# Patient Record
Sex: MALE | Race: WHITE | Employment: OTHER | URBAN - METROPOLITAN AREA
[De-identification: names, ages, dates, MRNs, and addresses within clinical notes are randomized per-mention and may not be internally consistent; named-entity substitution may affect disease eponyms.]

---

## 2017-02-22 PROBLEM — R33.9 URINARY RETENTION: Status: ACTIVE | Noted: 2017-02-22

## 2020-06-17 ENCOUNTER — HOSPITAL ENCOUNTER (OUTPATIENT)
Dept: PREADMISSION TESTING | Age: 75
Discharge: HOME | DRG: 660 | End: 2020-06-17
Payer: MEDICARE

## 2020-06-17 PROCEDURE — 87635 SARS-COV-2 COVID-19 AMP PRB: CPT

## 2020-06-18 LAB — SARS-COV-2, COV2NT: NOT DETECTED

## 2020-06-19 ENCOUNTER — HOSPITAL ENCOUNTER (INPATIENT)
Age: 75
LOS: 4 days | Discharge: HOME | DRG: 660 | End: 2020-06-23
Attending: UROLOGY | Admitting: FAMILY MEDICINE
Payer: MEDICARE

## 2020-06-19 PROBLEM — N13.30 BILATERAL HYDRONEPHROSIS: Status: ACTIVE | Noted: 2020-06-19

## 2020-06-19 LAB
ATRIAL RATE: 77 BPM
CALCULATED P AXIS, ECG09: 43 DEGREES
CALCULATED R AXIS, ECG10: -40 DEGREES
CALCULATED T AXIS, ECG11: 78 DEGREES
DIAGNOSIS, 93000: NORMAL
P-R INTERVAL, ECG05: 164 MS
Q-T INTERVAL, ECG07: 412 MS
QRS DURATION, ECG06: 94 MS
QTC CALCULATION (BEZET), ECG08: 466 MS
VENTRICULAR RATE, ECG03: 77 BPM

## 2020-06-19 PROCEDURE — 74011250637 HC RX REV CODE- 250/637: Performed by: FAMILY MEDICINE

## 2020-06-19 PROCEDURE — 93005 ELECTROCARDIOGRAM TRACING: CPT

## 2020-06-19 PROCEDURE — 87086 URINE CULTURE/COLONY COUNT: CPT

## 2020-06-19 PROCEDURE — 65270000029 HC RM PRIVATE

## 2020-06-19 PROCEDURE — 74011250636 HC RX REV CODE- 250/636: Performed by: FAMILY MEDICINE

## 2020-06-19 PROCEDURE — 77030040831 HC BAG URINE DRNG MDII -A

## 2020-06-19 PROCEDURE — 74011000258 HC RX REV CODE- 258: Performed by: FAMILY MEDICINE

## 2020-06-19 RX ORDER — ENOXAPARIN SODIUM 100 MG/ML
40 INJECTION SUBCUTANEOUS EVERY 24 HOURS
Status: DISCONTINUED | OUTPATIENT
Start: 2020-06-19 | End: 2020-06-19

## 2020-06-19 RX ORDER — ATORVASTATIN CALCIUM 40 MG/1
40 TABLET, FILM COATED ORAL DAILY
Status: DISCONTINUED | OUTPATIENT
Start: 2020-06-20 | End: 2020-06-23

## 2020-06-19 RX ORDER — ACETAMINOPHEN 325 MG/1
650 TABLET ORAL
Status: DISCONTINUED | OUTPATIENT
Start: 2020-06-19 | End: 2020-06-23

## 2020-06-19 RX ORDER — PANTOPRAZOLE SODIUM 20 MG/1
20 TABLET, DELAYED RELEASE ORAL
Status: DISCONTINUED | OUTPATIENT
Start: 2020-06-19 | End: 2020-06-20

## 2020-06-19 RX ORDER — METOPROLOL TARTRATE 50 MG/1
50 TABLET ORAL
Status: DISCONTINUED | OUTPATIENT
Start: 2020-06-19 | End: 2020-06-20

## 2020-06-19 RX ORDER — ENOXAPARIN SODIUM 100 MG/ML
40 INJECTION SUBCUTANEOUS EVERY 24 HOURS
Status: DISCONTINUED | OUTPATIENT
Start: 2020-06-19 | End: 2020-06-20

## 2020-06-19 RX ORDER — AMLODIPINE BESYLATE 2.5 MG/1
2.5 TABLET ORAL DAILY
Status: DISCONTINUED | OUTPATIENT
Start: 2020-06-20 | End: 2020-06-20

## 2020-06-19 RX ORDER — HYDRALAZINE HYDROCHLORIDE 20 MG/ML
10 INJECTION INTRAMUSCULAR; INTRAVENOUS
Status: DISCONTINUED | OUTPATIENT
Start: 2020-06-19 | End: 2020-06-23

## 2020-06-19 RX ORDER — LOSARTAN POTASSIUM 50 MG/1
50 TABLET ORAL 2 TIMES DAILY
Status: DISCONTINUED | OUTPATIENT
Start: 2020-06-19 | End: 2020-06-23

## 2020-06-19 RX ADMIN — PANTOPRAZOLE SODIUM 20 MG: 20 TABLET, DELAYED RELEASE ORAL at 17:36

## 2020-06-19 RX ADMIN — LOSARTAN POTASSIUM 50 MG: 50 TABLET ORAL at 17:36

## 2020-06-19 RX ADMIN — CEFTAZIDIME 1 G: 1 INJECTION, POWDER, FOR SOLUTION INTRAMUSCULAR; INTRAVENOUS at 17:38

## 2020-06-19 RX ADMIN — ENOXAPARIN SODIUM 40 MG: 40 INJECTION SUBCUTANEOUS at 17:36

## 2020-06-19 NOTE — H&P
History & Physical      Patient: Santiago Hill MRN: [id number]  CSN: [id number]    YOB: 1945  Age: 66 y.o. Sex: male      DOA: 6/19/2020    Chief Complaint: No chief complaint on file. HPI:     Santiago Hill is a 66 y.o. male with PMHx of bilateral hydroureteronephrosis, urinary retention, urinary stricture, prostate CA, colon CA s/p resection and pelvic radiation, CAD, MI, HTN, HLD, gastric ulcer and chronic constipation who is a direct admit to the hospital for bilateral hydroureternephrosis of unclear etiology who is scheduled for cystoscopy, urethral dilation, possible TURBT and bilateral diagnostic ureteroscopy on Monday 6/22/20. Pt is seen in room today, comfortable in bed, c/o foul-smelling urine lately, no other complaints. He has a suprapubic catheter that he's had for 6-8 years and was placed after prostate CA and colon CA. Reports he was started on a PO abx for UTI but the culture results came back showing he needed IV abx. Urine Cx from 6/10/20 show Pseudomonas and Stenotrophomonas both sensitive to ceftazidime. Mr. Luigi Valdez does mention he had an episode of dark stool this morning and has recent history of gastric ulcer. He has no other complaints and is looking forward to hopefully going home Monday after his procedure.      Past Medical History:   Diagnosis Date    CAD (coronary artery disease)     Colon cancer ([facility] [address] 1978    Alyse    Elevated cholesterol     Elevated PSA 3/21/13    Dr. Lillian Sevilla Erectile dysfunction     Exertional dyspnea     Groin pain, chronic, right     Hypertension     Incontinence     Infection of prosthesis ([address]     Kidney stone     MI (myocardial infarction) ([address] 2012    Nonspecific abnormal electrocardiogram (ECG) (EKG)     Prostate cancer (HCC)     cT1c aidee 4 + 5 adenocarcinoma of the prostate s/p cryoablation 9/9/13    Stress incontinence     Traumatic membranous urethral stricture     Ulcer 2010    Alyse Guernsey    Unspecified constipation     Urethral stricture     Urinary incontinence     Urinary retention     Urinary retention 2/22/2017    Urinary tract infection with hematuria        Past Surgical History:   Procedure Laterality Date    HX COLECTOMY  1978    HX CORONARY ARTERY BYPASS GRAFT  2010    CABG. .. triple    in [facility]    HX CRYOABLATION OF THE PROSTATE  9/9/13    HX HERNIA REPAIR      right   [facility]  [id number]    cystoscopy, laser cystolitholapaxy    HX UROLOGICAL  6/2015    pump in bladder    HX UROLOGICAL  6/17/16    Explant urinary sphincter    HX UROLOGICAL  12/28/2016    reddy awad, Cystourethroscopy, Urethral dilation, Suprapubic catheter placement       Family History   Problem Relation Age of Onset    Asthma Sister     Cancer Other         uncle, prostate       Social History     Socioeconomic History    Marital status:      Spouse name: Not on file    Number of children: Not on file    Years of education: Not on file    Highest education level: Not on file   Occupational History    Occupation: retired   Tobacco Use    Smoking status: Never Smoker    Smokeless tobacco: Never Used   Substance and Sexual Activity    Alcohol use: Yes     Alcohol/week: 0.0 - 1.0 standard drinks     Comment: seldom    Drug use: No    Sexual activity: Not Currently     Partners: Female       Prior to Admission medications    Medication Sig Start Date End Date Taking? Authorizing Provider   amLODIPine (NORVASC) 2.5 mg tablet take 1 tablet by mouth once daily 6/3/20  Yes Provider, Historical   losartan (COZAAR) 50 mg tablet take 1 tablet by mouth twice a day 5/13/20  Yes Provider, Historical   omeprazole (PRILOSEC) 20 mg capsule take 1 capsule by mouth twice a day for 30 DAYS THEN TAKE 1 once daily THEREAFTER 5/13/20  Yes Provider, Historical   sodium chloride irrigation (Sterile Saline) 0.9 % irrigation Use PRN for irrigation flushes.  5/6/20  Yes Gerry Sousa MD   Urinary Bag (BARD DISPOZ-A-BAG-FLIP-ARGENIS VLV) misc 10 Bags by Does Not Apply route as needed for Other. 20  Yes Leonela Zamora MD   metoprolol (LOPRESSOR) 100 mg tablet 50 mg at bedtime as directed for dose pack. 13  Yes Provider, Historical   cyanocobalamin (VITAMIN B-12) 500 mcg tablet Take 500 mcg by mouth daily. Yes Provider, Historical   atorvastatin (LIPITOR) 40 mg tablet Take 40 mg by mouth daily. Yes Provider, Historical   ergocalciferol (VITAMIN D2) 50,000 unit capsule Take 50,000 Units by mouth every seven (7) days. Yes Provider, Historical   aspirin 81 mg tablet Take 81 mg by mouth at bedtime as directed for dose pack. Yes Provider, Historical       Allergies   Allergen Reactions    Penicillins Hives     Has tolerated cefepime.  Bactrim [Sulfamethoxazole-Trimethoprim] Hives         Review of Systems  GENERAL: Patient alert, awake and oriented times 3, able to communicate full sentences and not in distress. HEENT: No change in vision, sore throat or sinus congestion. NECK: No pain or stiffness. PULMONARY: No shortness of breath, cough or wheeze. Cardiovascular: no pnd or orthopnea, no CP  GASTROINTESTINAL: No abdominal pain, nausea, vomiting or diarrhea, or blood per rectum. GENITOURINARY: + foul-smelling urine; No urinary frequency, urgency, hesitancy or dysuria. MUSCULOSKELETAL: No joint or muscle pain, no back pain  DERMATOLOGIC: No rash, no itching, no lesions. ENDOCRINE: No polyuria, polydipsia, no heat or cold intolerance. No recent change in weight. HEMATOLOGICAL: No anemia or easy bruising or bleeding. NEUROLOGIC: No headache, seizures, numbness, tingling or weakness.        Physical Exam:     Physical Exam:  Visit Vitals  /74 (BP 1 Location: Right arm, BP Patient Position: At rest)   Pulse 64   Temp 98.1 °F (36.7 °C)   Resp 18   SpO2 97%           Temp (24hrs), Av.1 °F (36.7 °C), Min:98.1 °F (36.7 °C), Max:98.1 °F (36.7 °C)    No intake/output data recorded. No intake/output data recorded. General:  Alert, cooperative, no distress, appears stated age. Head: Normocephalic, without obvious abnormality, atraumatic. Eyes:  Conjunctivae/corneas clear. PERRL, EOMs intact. Nose: Nares normal. No drainage or sinus tenderness. Neck: Supple, symmetrical, trachea midline, no adenopathy, thyroid: no enlargement, no carotid bruit and no JVD. Lungs:   Clear to auscultation bilaterally. Heart:   Irregularly irregular      Abdomen: Soft, non-tender. Bowel sounds normal. Suprapubic cath in place draining clear yellow urine; entry site is clean and dry     Extremities: Extremities normal, atraumatic, no cyanosis or edema. Pulses: 2+ and symmetric all extremities. Skin:  No rashes or lesions   Neurologic: AAOx3, No focal motor or sensory deficit. Labs Reviewed:    CMP: No results found for: NA, K, CL, CO2, AGAP, GLU, BUN, CREA, GFRAA, GFRNA, CA, MG, PHOS, ALB, TBIL, TP, ALB, GLOB, AGRAT, ALT  CBC: No results found for: WBC, HGB, HGBEXT, HCT, HCTEXT, PLT, PLTEXT, HGBEXT, HCTEXT, PLTEXT  Recent Glucose Results: No results found for: GLU  ABG: No results found for: PH, PHI, PCO2, PCO2I, PO2, PO2I, HCO3, HCO3I, FIO2, FIO2I        Assessment & Plan     Warrick Search is a 66 y.o. male with PMHx of bilateral hydroureteronephrosis, urinary retention, urinary stricture, prostate CA, colon CA s/p resection and pelvic radiation, CAD, MI, HTN, HLD, gastric ulcer and chronic constipation who is a direct admit to the hospital for bilateral hydroureternephrosis of unclear etiology who is scheduled for cystoscopy, urethral dilation, possible TURBT and bilateral diagnostic ureteroscopy on Monday 6/22/20. 1. Bilateral hydroureteronephrosis   2. Urinary retention   3. Urinary stricture    4. Hx prostate CA   5. Hx colon CA s/p resection and pelvic radiation   6. Gastric ulcer w/recent GI bleed   7. Report of dark stool   8. CAD   9. HTN   10.  HLD 11. Chronic constipation   12. Irregular HR     Urology consulted   Surgery planned for Monday   COVID-19 negative on 6/17/20   Start IV abx- ceftazidime 1 g q8hrs   Check CBC, BMP and urine cx in am  Check hemoccult    Will check EKG   Cont home meds: amlodipine, statin, losartan, metoprolol, PPI   Lovenox to be held on Sunday for surgery on Monday       DVT prophylaxis: Lovenox   Diet: Cardiac   Contact: Sarah Fragoso (wife)  [phone]  Code Status: FULL   Disposition: Admit to medical bed; >2 nights       Discussed with patient at bedside about hospital admission and my plan care, who understood and agreed with my plan care.       Nino Poole,    6/19/2020

## 2020-06-19 NOTE — CONSULTS
[address], [address]                                                      Phone: [phone]  Urology Consult Note             No diagnosis found. Assessment & Plan     ASSESSMENT:  Bilateral hydroureteronephrosis with no clear etiology s/p first right stent, then left in setting of sterile hydro initially, second event with sepsis  Dense BNC, inaccessible by dilation, with long term SP tube   T1c Aidee 4+5 CaP s/p cryoablation 4/2013, PSA 2/2020 <0.03   Colon cancer s/p Resection and pelvic radiation   Chronic Constipation   Recent Admit for GI Bleed       PLAN:  Pre-admit to medicine for IV abx for upcoming surgery on Monday- greatly appreciate assistance  Maintain SPT  Ucx ordered. Start Minoo Husbands today  BMP and CBC in am ordered  Patient c/o dark tarry stool today- defer to medicine- Dr. Marie Santizo aware  plan for cystoscopy with urethral dilation, possible TURBT/ random bladder biopsies, bilateral diagnostic ureteroscopy, bilateral JJ stent exchange and SP tube exchange 6/22/2020  Follow Up arranged: NO  Will see again on Sunday for Pre-op. Please contact sooner with any questions or concerns      Saundra Pastor  [facility]  Pager # [phone]    Available M-F 7:30- 5pm .  After 5pm and weekends please call answering service at [phone]         No chief complaint on file. HISTORY OF PRESENT ILLNESS:    Dinorah Bacon is a 66 y.o. male who is seen in consultation as referred by Antwan Suero  for Pre-Admit for urological procedure on Monday. Patient has  been seen by a urologist and is  currently receiving urological care with Dr. Vitaly Katz. He was previously followed by Dr. Michael Nunez for urinary retention managed with an SP tube. Pt with a hx of aidee 4+5 CaP s/p cryoablation in 2013.  He had a  sphincter placed in the past for incontinence but it was removed due to erosion and pain 6/2016. Pt was then noted to have a urethral stricture after the sphincter was removed and an SP tube was placed. Due to prior radiation from colon cancer it was thought that repair of the stricture would not be successful. S/p SP tube placement 12/2016. Has been having monthly SP tube exchanges. Pt recently had two admissions for hydronephrosis. S/p left stent placement 4/27/2020 and right stent placement 5/6/2020. He also had a catheter placed during his last admission in preparation for a cystoscopy with bladder biopsy, bilateral URS scheduled 6/22/2020. HPI: Patient is Pre-admitted for IV abx 2/2 MDRO. Denies any issues with SPT. No FCNV No abdominal pain, no flank pain. Does state some dark tarry stool today- worried due to recent admit for upper GI bleed. No longer on ASA. No other complaints    Location  tract  Duration Months   Associated signs/symptoms as above  Modifying factors SPT  Severity unknown        No flowsheet data found.       Past Medical History:   Diagnosis Date    CAD (coronary artery disease)     Colon cancer ([facility] [address] 1978    Alyse    Elevated cholesterol     Elevated PSA 3/21/13    Dr. Donnie Desai Erectile dysfunction     Exertional dyspnea     Groin pain, chronic, right     Hypertension     Incontinence     Infection of prosthesis ([address]     Kidney stone     MI (myocardial infarction) ([address] 2012    Nonspecific abnormal electrocardiogram (ECG) (EKG)     Prostate cancer (HCC)     cT1c aidee 4 + 5 adenocarcinoma of the prostate s/p cryoablation 9/9/13    Stress incontinence     Traumatic membranous urethral stricture     Ulcer 2010    Alyse Alcaraz    Unspecified constipation     Urethral stricture     Urinary incontinence     Urinary retention     Urinary retention 2/22/2017    Urinary tract infection with hematuria        Past Surgical History:   Procedure Laterality Date    HX COLECTOMY  1978    HX CORONARY ARTERY BYPASS GRAFT  2010    [facility]G. .. triple    in [facility]    HX CRYOABLATION OF THE PROSTATE  9/9/13    HX HERNIA REPAIR      right   [facility]  [id number]    cystoscopy, laser cystolitholapaxy    HX UROLOGICAL  6/2015    pump in bladder    HX UROLOGICAL  6/17/16    Explant urinary sphincter    HX UROLOGICAL  12/28/2016    reddy awad, Cystourethroscopy, Urethral dilation, Suprapubic catheter placement       Social History     Tobacco Use    Smoking status: Never Smoker    Smokeless tobacco: Never Used   Substance Use Topics    Alcohol use: Yes     Alcohol/week: 0.0 - 1.0 standard drinks     Comment: seldom    Drug use: No       Allergies   Allergen Reactions    Penicillins Hives     Has tolerated cefepime.  Bactrim [Sulfamethoxazole-Trimethoprim] Hives       Family History   Problem Relation Age of Onset    Asthma Sister     Cancer Other         uncle, prostate           Review of Systems    Pertinent items are noted in the History of Present Illness. PHYSICAL EXAMINATION:   There were no vitals taken for this visit. Constitutional: Well developed, well nourished male. No acute distress. HEENT: Normocephalic, Atraumatic  CV:  RRR   Pulm: No respiratory distress or difficulties breathing   GI:  No abdominal masses or tenderness. :  No CVA tenderness. SPT draining cloudy yellow urine  SCROTUM:  No scrotal rash or lesions noticed. Normal bilateral testes and epididymis. PENIS: Urethral meatus normal in location and size. No urethral discharge. Circumcised   Skin: No evidence of jaundice. Normal color  Neuro/Psych:  Alert and oriented. Affect appropriate. Lymphatic:   No inguinal lymphadenopathy  MSK: FROM         REVIEW OF LABS AND IMAGING:        Labs: Results:   Chemistry  No results for input(s): GLU, NA, K, CL, CO2, BUN, CREA, CA, AGAP, BUCR, TBIL, AP, TP, ALB, GLOB, AGRAT in the last 72 hours.     No lab exists for component: GPT   CBC w/Diff No results for input(s): WBC, RBC, HGB, HCT, PLT, GRANS, LYMPH, EOS, HGBEXT, HCTEXT, PLTEXT in the last 72 hours. Cultures No results for input(s): CULT in the last 72 hours. All Micro Results     None            Urinalysis Potassium   Date Value Ref Range Status   06/10/2020 4.4 3.5 - 5.2 mmol/L Final     Creatinine   Date Value Ref Range Status   06/10/2020 0.78 0.76 - 1.27 mg/dL Final     BUN   Date Value Ref Range Status   06/10/2020 11 8 - 27 mg/dL Final     Prostate Specific Ag   Date Value Ref Range Status   11/05/2014 <0.03 0.00 - 4.00 ng/mL Final      PSA No results for input(s): PSA in the last 72 hours.    Coagulation Lab Results   Component Value Date/Time    Prothrombin time 10.8 01/16/2015 11:18 AM    INR 1.09 01/16/2015 11:18 AM

## 2020-06-20 LAB
ANION GAP SERPL CALC-SCNC: 5 MMOL/L (ref 3–18)
BACTERIA SPEC CULT: ABNORMAL
BUN SERPL-MCNC: 12 MG/DL (ref 7–18)
BUN/CREAT SERPL: 19 (ref 12–20)
CALCIUM SERPL-MCNC: 9.2 MG/DL (ref 8.5–10.1)
CC UR VC: ABNORMAL
CHLORIDE SERPL-SCNC: 107 MMOL/L (ref 100–111)
CO2 SERPL-SCNC: 27 MMOL/L (ref 21–32)
CREAT SERPL-MCNC: 0.62 MG/DL (ref 0.6–1.3)
ERYTHROCYTE [DISTWIDTH] IN BLOOD BY AUTOMATED COUNT: 13.7 % (ref 11.6–14.5)
GLUCOSE SERPL-MCNC: 97 MG/DL (ref 74–99)
HCT VFR BLD AUTO: 32.1 % (ref 36–48)
HCT VFR BLD AUTO: 33.5 % (ref 36–48)
HGB BLD-MCNC: 10.4 G/DL (ref 13–16)
HGB BLD-MCNC: 11.2 G/DL (ref 13–16)
MCH RBC QN AUTO: 28.1 PG (ref 24–34)
MCHC RBC AUTO-ENTMCNC: 32.4 G/DL (ref 31–37)
MCV RBC AUTO: 86.8 FL (ref 74–97)
PLATELET # BLD AUTO: 370 K/UL (ref 135–420)
PMV BLD AUTO: 9.7 FL (ref 9.2–11.8)
POTASSIUM SERPL-SCNC: 3.8 MMOL/L (ref 3.5–5.5)
RBC # BLD AUTO: 3.7 M/UL (ref 4.7–5.5)
SERVICE CMNT-IMP: ABNORMAL
SODIUM SERPL-SCNC: 139 MMOL/L (ref 136–145)
WBC # BLD AUTO: 6.1 K/UL (ref 4.6–13.2)

## 2020-06-20 PROCEDURE — 74011250637 HC RX REV CODE- 250/637: Performed by: FAMILY MEDICINE

## 2020-06-20 PROCEDURE — 85018 HEMOGLOBIN: CPT

## 2020-06-20 PROCEDURE — 80048 BASIC METABOLIC PNL TOTAL CA: CPT

## 2020-06-20 PROCEDURE — 36415 COLL VENOUS BLD VENIPUNCTURE: CPT

## 2020-06-20 PROCEDURE — 74011000258 HC RX REV CODE- 258

## 2020-06-20 PROCEDURE — 74011250636 HC RX REV CODE- 250/636

## 2020-06-20 PROCEDURE — 85027 COMPLETE CBC AUTOMATED: CPT

## 2020-06-20 PROCEDURE — 65270000029 HC RM PRIVATE

## 2020-06-20 PROCEDURE — 74011250636 HC RX REV CODE- 250/636: Performed by: FAMILY MEDICINE

## 2020-06-20 PROCEDURE — 74011250637 HC RX REV CODE- 250/637: Performed by: INTERNAL MEDICINE

## 2020-06-20 PROCEDURE — 74011000258 HC RX REV CODE- 258: Performed by: FAMILY MEDICINE

## 2020-06-20 RX ORDER — ERGOCALCIFEROL 1.25 MG/1
50000 CAPSULE ORAL
Status: DISCONTINUED | OUTPATIENT
Start: 2020-06-20 | End: 2020-06-23

## 2020-06-20 RX ORDER — AMLODIPINE BESYLATE 5 MG/1
5 TABLET ORAL 2 TIMES DAILY
Status: DISCONTINUED | OUTPATIENT
Start: 2020-06-20 | End: 2020-06-21

## 2020-06-20 RX ORDER — METOPROLOL TARTRATE 50 MG/1
50 TABLET ORAL
Status: DISCONTINUED | OUTPATIENT
Start: 2020-06-20 | End: 2020-06-23

## 2020-06-20 RX ORDER — LANOLIN ALCOHOL/MO/W.PET/CERES
500 CREAM (GRAM) TOPICAL DAILY
Status: DISCONTINUED | OUTPATIENT
Start: 2020-06-21 | End: 2020-06-23

## 2020-06-20 RX ORDER — ONDANSETRON 2 MG/ML
4 INJECTION INTRAMUSCULAR; INTRAVENOUS
Status: DISCONTINUED | OUTPATIENT
Start: 2020-06-20 | End: 2020-06-23

## 2020-06-20 RX ORDER — AMOXICILLIN 250 MG
1 CAPSULE ORAL DAILY
Status: DISCONTINUED | OUTPATIENT
Start: 2020-06-20 | End: 2020-06-23

## 2020-06-20 RX ORDER — POLYETHYLENE GLYCOL 3350 17 G/17G
17 POWDER, FOR SOLUTION ORAL
Status: DISCONTINUED | OUTPATIENT
Start: 2020-06-20 | End: 2020-06-23

## 2020-06-20 RX ORDER — PANTOPRAZOLE SODIUM 40 MG/1
40 TABLET, DELAYED RELEASE ORAL
Status: DISCONTINUED | OUTPATIENT
Start: 2020-06-20 | End: 2020-06-23

## 2020-06-20 RX ADMIN — AMLODIPINE BESYLATE 5 MG: 5 TABLET ORAL at 17:05

## 2020-06-20 RX ADMIN — PANTOPRAZOLE SODIUM 40 MG: 20 TABLET, DELAYED RELEASE ORAL at 15:37

## 2020-06-20 RX ADMIN — METOPROLOL TARTRATE 50 MG: 50 TABLET, FILM COATED ORAL at 22:07

## 2020-06-20 RX ADMIN — CEFTAZIDIME 1 G: 1 INJECTION, POWDER, FOR SOLUTION INTRAMUSCULAR; INTRAVENOUS at 22:08

## 2020-06-20 RX ADMIN — ACETAMINOPHEN 650 MG: 325 TABLET ORAL at 22:09

## 2020-06-20 RX ADMIN — CEFTAZIDIME 1 G: 1 INJECTION, POWDER, FOR SOLUTION INTRAMUSCULAR; INTRAVENOUS at 05:56

## 2020-06-20 RX ADMIN — CEFTAZIDIME 1 G: 1 INJECTION, POWDER, FOR SOLUTION INTRAMUSCULAR; INTRAVENOUS at 02:28

## 2020-06-20 RX ADMIN — ATORVASTATIN CALCIUM 40 MG: 40 TABLET, FILM COATED ORAL at 08:05

## 2020-06-20 RX ADMIN — CEFTAZIDIME 1 G: 1 INJECTION, POWDER, FOR SOLUTION INTRAMUSCULAR; INTRAVENOUS at 14:27

## 2020-06-20 RX ADMIN — LOSARTAN POTASSIUM 50 MG: 50 TABLET ORAL at 17:06

## 2020-06-20 RX ADMIN — PANTOPRAZOLE SODIUM 20 MG: 20 TABLET, DELAYED RELEASE ORAL at 05:56

## 2020-06-20 RX ADMIN — ERGOCALCIFEROL 50000 UNITS: 1.25 CAPSULE ORAL at 17:05

## 2020-06-20 RX ADMIN — AMLODIPINE BESYLATE 2.5 MG: 2.5 TABLET ORAL at 08:05

## 2020-06-20 RX ADMIN — ENOXAPARIN SODIUM 40 MG: 40 INJECTION SUBCUTANEOUS at 15:21

## 2020-06-20 RX ADMIN — LOSARTAN POTASSIUM 50 MG: 50 TABLET ORAL at 08:06

## 2020-06-20 RX ADMIN — DOCUSATE SODIUM 50 MG AND SENNOSIDES 8.6 MG 1 TABLET: 8.6; 5 TABLET, FILM COATED ORAL at 17:05

## 2020-06-20 NOTE — PROGRESS NOTES
Patient a direct admit to hospital from Dr. Hood Napier office. Francois Chaves NP paged that patient had arrived. Per NP patient will be seen by Hospitalist and orders will be written then. Patient oriented to room, bed, call bell and TV. White board updated. Patient given a gown and socks to change into. No distress noted. Patient alert and oriented x 4. Respirations even and unlabored. Patient has supra pubic catheter with leg bag draining clear yellow urine. Skin dry and warm and intact.

## 2020-06-20 NOTE — PROGRESS NOTES
IV started for antibiotics. Leg bag on morgan changed to large catheter bag. UA sent to lab. EKG was completed earlier per orders. No complaints of pain. VSS.

## 2020-06-20 NOTE — PROGRESS NOTES
SUBJECTIVE:    Patient is sitting up in chair without any issues. Denies any headaches or dizziness. No nausea vomiting. No flank pain or abdominal pain. No bowel or bladder disturbances. He states he has been patiently waiting to have surgery done on Monday so he can go home. OBJECTIVE:    /73 (BP 1 Location: Right arm, BP Patient Position: At rest)   Pulse 75   Temp 97.2 °F (36.2 °C)   Resp 16   SpO2 99%     General appearance - alert, well appearing, and in no distress  HEENT -atraumatic, normocephalic, no pallor, oral mucosa is moist  Neck -no JVD and trachea is midline  Chest -clear air entry noted in bases, no wheezes  Heart - S1 and S2 normal  Abdomen - soft, nontender, nondistended, Bowel sounds present, suprapubic catheter in situ  Neurological - alert, oriented, normal speech, no focal findings noted  Musculoskeletal - no joint tenderness or swelling of knees bilaterally  Extremities - no pedal edema noted    ASSESSMENT:    1.  Bilateral hydroureteronephrosis  2. Urinary retention due to urinary stricture status post suprapubic catheter  3. History of prostate cancer  4. History of colon cancer status post resections and pelvic radiation  5. Gastric ulcer with recent GI bleed  6. Dark stool, improving. Stable H&H currently. 7.  Coronary artery disease  8. Hypertension  9. Dyslipidemia  10. Constipation    PLAN:    Continue current management  Urology input noted about urological procedure on Monday  Increase home dose of Norvasc and monitor blood pressure  Add Lilian-Colace for #10  Restart vitamin B12 and vitamin D  Continue PPI and monitor H&H    Disclaimer: Sections of this note are dictated using utilizing voice recognition software, which may have resulted in some phonetic based errors in grammar and contents. Even though attempts were made to correct all the mistakes, some may have been missed, and remained in the body of the document.  If questions arise, please contact our department.     Recent Results (from the past 24 hour(s))   EKG, 12 LEAD, INITIAL    Collection Time: 06/19/20  3:31 PM   Result Value Ref Range    Ventricular Rate 77 BPM    Atrial Rate 77 BPM    P-R Interval 164 ms    QRS Duration 94 ms    Q-T Interval 412 ms    QTC Calculation (Bezet) 466 ms    Calculated P Axis 43 degrees    Calculated R Axis -40 degrees    Calculated T Axis 78 degrees    Diagnosis       Sinus rhythm with sinus arrhythmia with frequent premature ventricular   complexes  Left axis deviation  Minimal voltage criteria for LVH, may be normal variant  Abnormal ECG  When compared with ECG of 14-JUN-2016 11:39,  premature ventricular complexes are now present  Nonspecific T wave abnormality no longer evident in Inferior leads  T wave inversion no longer evident in Anterolateral leads  Confirmed by Kate Marquez ([id number]) on 6/19/2020 4:04:09 PM     CBC W/O DIFF    Collection Time: 06/20/20  3:48 AM   Result Value Ref Range    WBC 6.1 4.6 - 13.2 K/uL    RBC 3.70 (L) 4.70 - 5.50 M/uL    HGB 10.4 (L) 13.0 - 16.0 g/dL    HCT 32.1 (L) 36.0 - 48.0 %    MCV 86.8 74.0 - 97.0 FL    MCH 28.1 24.0 - 34.0 PG    MCHC 32.4 31.0 - 37.0 g/dL    RDW 13.7 11.6 - 14.5 %    PLATELET 784 848 - 083 K/uL    MPV 9.7 9.2 - 92.5 FL   METABOLIC PANEL, BASIC    Collection Time: 06/20/20  3:48 AM   Result Value Ref Range    Sodium 139 136 - 145 mmol/L    Potassium 3.8 3.5 - 5.5 mmol/L    Chloride 107 100 - 111 mmol/L    CO2 27 21 - 32 mmol/L    Anion gap 5 3.0 - 18 mmol/L    Glucose 97 74 - 99 mg/dL    BUN 12 7.0 - 18 MG/DL    Creatinine 0.62 0.6 - 1.3 MG/DL    BUN/Creatinine ratio 19 12 - 20      GFR est AA >60 >60 ml/min/1.73m2    GFR est non-AA >60 >60 ml/min/1.73m2    Calcium 9.2 8.5 - 10.1 MG/DL 170.18

## 2020-06-20 NOTE — ROUTINE PROCESS
Bedside shift change report given to Deyanira Eason RN (oncoming nurse) by Rissa Marie RN (offgoing nurse). Report included the following information SBAR, Kardex, Intake/Output, MAR, Recent Results and Med Rec Status.

## 2020-06-20 NOTE — PROGRESS NOTES
Problem: Falls - Risk of  Goal: *Absence of Falls  Description: Document Kira Rushing Fall Risk and appropriate interventions in the flowsheet.   Outcome: Progressing Towards Goal  Note: Fall Risk Interventions:            Medication Interventions: Assess postural VS orthostatic hypotension, Evaluate medications/consider consulting pharmacy, Patient to call before getting OOB, Teach patient to arise slowly                   Problem: Patient Education: Go to Patient Education Activity  Goal: Patient/Family Education  Outcome: Progressing Towards Goal

## 2020-06-20 NOTE — PROGRESS NOTES
End of Shift Note     Bedside and verbal shift change report given to  Ashlyn Ambrose (On coming nurse) by  Diego Mejia (Off going nurse).   Report included the following information:      --Procedure Summary     --MAR,     --Recent Results     --Med Rec Status    SBAR Recommendations:    Issues for Provider to address           Activity This Shift     [] Bed Rest Order   [] Refused   [] Dangled    [] TDWB         Ambulating:     [] Bathroom     [] BSC     [x] Room/Hallway      Up in Chair for meals    [x]Yes [] No   Voiding       [] Yes  [] No  Alcocer          [x] Yes  [] No  Incontinent [] Yes  [] No    DUE TO VOID POUR        [] Yes [] No  Purewick    [] Yes [] No  New Onset [] Yes [] No Straight Cath   []Yes  [] No  Condom Cath  [] Yes [] No  MD Called      [] Yes  [] No   Blood Sugars Managed []Yes [x] No    Bowels Moved [] Yes [x] No    Incontinent     [] Yes [] No Passed Gas []Yes [] No    New Onset  []Yes [] No        MD Called []Yes  [] No     CHG Bath Done     Before Surgery     After Surgery      [] Yes  [] No  [] Yes  [] No       Drain Removed [] Yes  [] No [x] N/A    Dressing Changed [] Yes   [] No [x] N/A      Nausea/Vomiting [] Yes   [x] No     Ice Packs Changed [] Yes   [] No  [x] N/A    Incentive Spirometer  [] Yes  [x] No      SCD Pumps On     Ankle Pumping  [] Yes   [x] No      [] Yes   [x] No        Telemetry Monitoring [] Yes   [x] No   Rhythm

## 2020-06-20 NOTE — PROGRESS NOTES
Problem: Falls - Risk of  Goal: *Absence of Falls  Description: Document Marilu Carrel Fall Risk and appropriate interventions in the flowsheet.   Outcome: Progressing Towards Goal  Note: Fall Risk Interventions:            Medication Interventions: Assess postural VS orthostatic hypotension, Patient to call before getting OOB, Teach patient to arise slowly                   Problem: Patient Education: Go to Patient Education Activity  Goal: Patient/Family Education  Outcome: Progressing Towards Goal

## 2020-06-20 NOTE — ROUTINE PROCESS
Bedside shift change report given to [facility], RN (oncoming nurse) by Que Garcia RN (offgoing nurse). Report included the following information SBAR, Kardex, Intake/Output, MAR, Recent Results and Med Rec Status.

## 2020-06-20 NOTE — PROGRESS NOTES
End of Shift Note     Bedside and verbal shift change report given to  mina esquivel (On coming nurse) by  Dexter Garcia (Off going nurse).   Report included the following information:      --Procedure Summary     --MAR,     --Recent Results     --Med Rec Status    SBAR Recommendations:    Issues for Provider to address           Activity This Shift     [] Bed Rest Order   [] Refused   [] Dangled    [] TDWB         Ambulating:     [] Bathroom     [] BSC     [x] Room/Hallway      Up in Chair for meals    [x]Yes [] No   Voiding       [] Yes  [] No  Alcocer          [x] Yes  [] No  Incontinent [] Yes  [] No    DUE TO VOID POUR        [] Yes [] No  Purewick    [] Yes [] No  New Onset [] Yes [] No Straight Cath   []Yes  [] No  Condom Cath  [] Yes [] No  MD Called      [] Yes  [] No   Blood Sugars Managed []Yes [x] No    Bowels Moved [] Yes [x] No    Incontinent     [] Yes [] No Passed Gas []Yes [] No    New Onset  []Yes [] No        MD Called []Yes  [] No     CHG Bath Done     Before Surgery     After Surgery      [] Yes  [] No  [] Yes  [] No       Drain Removed [] Yes  [] No [x] N/A    Dressing Changed [] Yes   [] No [x] N/A      Nausea/Vomiting [] Yes   [x] No     Ice Packs Changed [] Yes   [] No  [x] N/A    Incentive Spirometer  [] Yes  [x] No      SCD Pumps On     Ankle Pumping  [] Yes   [x] No      [] Yes   [x] No        Telemetry Monitoring [] Yes   [x] No   Rhythm

## 2020-06-21 ENCOUNTER — ANESTHESIA EVENT (OUTPATIENT)
Dept: SURGERY | Age: 75
DRG: 660 | End: 2020-06-21
Payer: MEDICARE

## 2020-06-21 LAB
ANION GAP SERPL CALC-SCNC: 5 MMOL/L (ref 3–18)
BASOPHILS # BLD: 0.1 K/UL (ref 0–0.1)
BASOPHILS NFR BLD: 1 % (ref 0–2)
BUN SERPL-MCNC: 11 MG/DL (ref 7–18)
BUN/CREAT SERPL: 20 (ref 12–20)
CALCIUM SERPL-MCNC: 9.2 MG/DL (ref 8.5–10.1)
CHLORIDE SERPL-SCNC: 109 MMOL/L (ref 100–111)
CO2 SERPL-SCNC: 23 MMOL/L (ref 21–32)
CREAT SERPL-MCNC: 0.56 MG/DL (ref 0.6–1.3)
DIFFERENTIAL METHOD BLD: ABNORMAL
EOSINOPHIL # BLD: 0.2 K/UL (ref 0–0.4)
EOSINOPHIL NFR BLD: 3 % (ref 0–5)
ERYTHROCYTE [DISTWIDTH] IN BLOOD BY AUTOMATED COUNT: 13.6 % (ref 11.6–14.5)
GLUCOSE SERPL-MCNC: 90 MG/DL (ref 74–99)
HCT VFR BLD AUTO: 33.2 % (ref 36–48)
HGB BLD-MCNC: 10.7 G/DL (ref 13–16)
LYMPHOCYTES # BLD: 1.8 K/UL (ref 0.9–3.6)
LYMPHOCYTES NFR BLD: 28 % (ref 21–52)
MCH RBC QN AUTO: 27.9 PG (ref 24–34)
MCHC RBC AUTO-ENTMCNC: 32.2 G/DL (ref 31–37)
MCV RBC AUTO: 86.5 FL (ref 74–97)
MONOCYTES # BLD: 0.7 K/UL (ref 0.05–1.2)
MONOCYTES NFR BLD: 10 % (ref 3–10)
NEUTS SEG # BLD: 3.7 K/UL (ref 1.8–8)
NEUTS SEG NFR BLD: 58 % (ref 40–73)
PLATELET # BLD AUTO: 385 K/UL (ref 135–420)
PMV BLD AUTO: 9.7 FL (ref 9.2–11.8)
POTASSIUM SERPL-SCNC: 4.5 MMOL/L (ref 3.5–5.5)
RBC # BLD AUTO: 3.84 M/UL (ref 4.7–5.5)
SODIUM SERPL-SCNC: 137 MMOL/L (ref 136–145)
WBC # BLD AUTO: 6.4 K/UL (ref 4.6–13.2)

## 2020-06-21 PROCEDURE — 74011250636 HC RX REV CODE- 250/636: Performed by: INTERNAL MEDICINE

## 2020-06-21 PROCEDURE — 36415 COLL VENOUS BLD VENIPUNCTURE: CPT

## 2020-06-21 PROCEDURE — 74011000258 HC RX REV CODE- 258: Performed by: INTERNAL MEDICINE

## 2020-06-21 PROCEDURE — 74011250637 HC RX REV CODE- 250/637: Performed by: INTERNAL MEDICINE

## 2020-06-21 PROCEDURE — 74011000258 HC RX REV CODE- 258: Performed by: FAMILY MEDICINE

## 2020-06-21 PROCEDURE — 65270000029 HC RM PRIVATE

## 2020-06-21 PROCEDURE — 85025 COMPLETE CBC W/AUTO DIFF WBC: CPT

## 2020-06-21 PROCEDURE — 74011250637 HC RX REV CODE- 250/637: Performed by: FAMILY MEDICINE

## 2020-06-21 PROCEDURE — 74011250636 HC RX REV CODE- 250/636: Performed by: FAMILY MEDICINE

## 2020-06-21 PROCEDURE — 80048 BASIC METABOLIC PNL TOTAL CA: CPT

## 2020-06-21 RX ORDER — DEXTROSE MONOHYDRATE AND SODIUM CHLORIDE 5; .9 G/100ML; G/100ML
75 INJECTION, SOLUTION INTRAVENOUS CONTINUOUS
Status: DISCONTINUED | OUTPATIENT
Start: 2020-06-21 | End: 2020-06-23

## 2020-06-21 RX ORDER — AMLODIPINE BESYLATE 10 MG/1
10 TABLET ORAL DAILY
Status: DISCONTINUED | OUTPATIENT
Start: 2020-06-22 | End: 2020-06-23

## 2020-06-21 RX ORDER — AMLODIPINE BESYLATE 5 MG/1
5 TABLET ORAL
Status: COMPLETED | OUTPATIENT
Start: 2020-06-21 | End: 2020-06-21

## 2020-06-21 RX ADMIN — CEFTAZIDIME 1 G: 1 INJECTION, POWDER, FOR SOLUTION INTRAMUSCULAR; INTRAVENOUS at 15:43

## 2020-06-21 RX ADMIN — CEFTAZIDIME 1 G: 1 INJECTION, POWDER, FOR SOLUTION INTRAMUSCULAR; INTRAVENOUS at 22:48

## 2020-06-21 RX ADMIN — DOCUSATE SODIUM 50 MG AND SENNOSIDES 8.6 MG 1 TABLET: 8.6; 5 TABLET, FILM COATED ORAL at 08:06

## 2020-06-21 RX ADMIN — CEFTAZIDIME 1 G: 1 INJECTION, POWDER, FOR SOLUTION INTRAMUSCULAR; INTRAVENOUS at 06:57

## 2020-06-21 RX ADMIN — LOSARTAN POTASSIUM 50 MG: 50 TABLET ORAL at 18:13

## 2020-06-21 RX ADMIN — LOSARTAN POTASSIUM 50 MG: 50 TABLET ORAL at 08:06

## 2020-06-21 RX ADMIN — METOPROLOL TARTRATE 50 MG: 50 TABLET, FILM COATED ORAL at 21:48

## 2020-06-21 RX ADMIN — PANTOPRAZOLE SODIUM 40 MG: 20 TABLET, DELAYED RELEASE ORAL at 06:57

## 2020-06-21 RX ADMIN — AMLODIPINE BESYLATE 5 MG: 5 TABLET ORAL at 13:41

## 2020-06-21 RX ADMIN — AMLODIPINE BESYLATE 5 MG: 5 TABLET ORAL at 08:06

## 2020-06-21 RX ADMIN — DEXTROSE MONOHYDRATE AND SODIUM CHLORIDE 75 ML/HR: 5; .9 INJECTION, SOLUTION INTRAVENOUS at 22:50

## 2020-06-21 RX ADMIN — ATORVASTATIN CALCIUM 40 MG: 40 TABLET, FILM COATED ORAL at 08:06

## 2020-06-21 RX ADMIN — CYANOCOBALAMIN TAB 500 MCG 500 MCG: 500 TAB at 08:07

## 2020-06-21 NOTE — PROGRESS NOTES
Progress Note    Patient: Sanket Sauer MRN: [id number]  SSN: [id number]    YOB: 1945  Age: 66 y.o.   Sex: male      Admit Date: 6/19/2020    LOS: 2 days     Subjective:     No events no complaints    Objective:     Vitals:    06/20/20 1923 06/21/20 0246 06/21/20 0341 06/21/20 0805   BP: 169/74 139/68  151/67   Pulse: 75 66  66   Resp: 15 16  16   Temp: 98.8 °F (37.1 °C) 97.9 °F (36.6 °C)  97.5 °F (36.4 °C)   SpO2: 96% 96%  99%   Weight:   196 lb 8 oz (89.1 kg)         Intake and Output:  Current Shift: 06/21 0701 - 06/21 1900  In: 240 [P.O.:240]  Out: 400 [Urine:400]  Last three shifts: 06/19 1901 - 06/21 0700  In: 2430 [P.O.:2280; I.V.:150]  Out: 3825 [Urine:3825]    Physical Exam:   GENERAL: alert, cooperative, no distress, appears stated age  LUNG: unlabored  HEART: regular rate and rhythm  ABDOMEN: Soft, Non tender  EXTREMITIES:  extremities normal, atraumatic, no cyanosis or edema  SKIN: Normal.  PSYCHIATRIC: non focal      Lab/Data Review:    Lab Results   Component Value Date/Time    WBC 6.4 06/21/2020 05:23 AM    HGB 10.7 (L) 06/21/2020 05:23 AM    HCT 33.2 (L) 06/21/2020 05:23 AM    PLATELET 339 17/30/7112 05:23 AM    MCV 86.5 06/21/2020 05:23 AM       Lab Results   Component Value Date/Time    Sodium 137 06/21/2020 05:23 AM    Potassium 4.5 06/21/2020 05:23 AM    Chloride 109 06/21/2020 05:23 AM    CO2 23 06/21/2020 05:23 AM    Anion gap 5 06/21/2020 05:23 AM    Glucose 90 06/21/2020 05:23 AM    BUN 11 06/21/2020 05:23 AM    Creatinine 0.56 (L) 06/21/2020 05:23 AM    BUN/Creatinine ratio 20 06/21/2020 05:23 AM    GFR est AA >60 06/21/2020 05:23 AM    GFR est non-AA >60 06/21/2020 05:23 AM    Calcium 9.2 06/21/2020 05:23 AM     Bilateral hydroureteronephrosis with no clear etiology s/p first right stent, then left in setting of sterile hydro initially, second event with sepsis  Dense BNC, 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  40k gram neg on Ceftaz  plan for cystoscopy with urethral dilation, possible TURBT/ random bladder biopsies, bilateral diagnostic ureteroscopy, bilateral JJ stent exchange and SP tube exchange 6/22/2020  NPO after midnight    Signed By: Viviana Garces MD     June 21, 2020

## 2020-06-21 NOTE — PROGRESS NOTES
conducted an initial consultation and Spiritual Assessment for Melody, who is a 66 y.o.,male. Patients Primary Language is: [place]. According to the patients EMR Adventist Affiliation is: No preference. The reason the Patient came to the hospital is:   Patient Active Problem List    Diagnosis Date Noted    Bilateral hydronephrosis 06/19/2020    Urinary retention 02/22/2017    Elevated cholesterol     Exertional dyspnea     Groin pain, chronic, right     Incontinence     Infection of prosthesis ([address]     Kidney stone     Nonspecific abnormal electrocardiogram (ECG) (EKG)     Prostate cancer (HCC)     Stress incontinence     Urinary tract infection with hematuria     Urinary incontinence 05/27/2015    Erectile dysfunction  01/06/2015    RANDY post prostate cryo and prior pelvic XRT 08/12/2014    Preoperative clearance 09/05/2013    S/P CABG x 3 09/05/2013    HTN (hypertension) 09/05/2013    High cholesterol 09/05/2013    Colon cancer ([address] 09/05/2013    H/O non-ST elevation myocardial infarction (NSTEMI) 09/05/2013    CAD (coronary artery disease)     cT1c aidee 4 + 5 adenocarcinoma of the prostate s/p cryoablation 9/9/13 -- VIPIN 07/31/2013    MI (myocardial infarction) ([address] 01/01/2012    Gastric ulcer 10/20/2010    Hemorrhage of gastrointestinal tract 10/18/2010    History of malignant neoplasm of large intestine 10/18/2010    Diverticulosis of colon with hemorrhage 10/18/2010    Coronary atherosclerosis 10/18/2010    Atrial fibrillation ([address] 02/14/2010    Ulcer 01/01/2010        The  provided the following Interventions:  Initiated a relationship of care and support. Provided information about Spiritual Care Services. Chart reviewed. The following outcomes where achieved:   confirmed Patient's Adventist Affiliation. Patient expressed gratitude for 's visit.     Assessment:  Patient does not have any Congregational/cultural needs that will affect patients preferences in health care. There are no spiritual or Mormonism issues which require intervention at this time. Plan:  Chaplains will continue to follow and will provide pastoral care on an as needed/requested basis.  recommends bedside caregivers page  on duty if patient shows signs of acute spiritual or emotional distress.     [address]  [phone])

## 2020-06-21 NOTE — PROGRESS NOTES
SUBJECTIVE:    Patient is sitting up in chair without any issues. Patient is wondering about timing of surgery. He tentatively knows that it will be done around 2:30 PM tomorrow. Denies any headaches or dizziness. No nausea vomiting. No flank pain or abdominal pain. No bowel or bladder disturbances. OBJECTIVE:    /67   Pulse 66   Temp 97.5 °F (36.4 °C)   Resp 16   Wt 89.1 kg (196 lb 8 oz)   SpO2 99%   BMI 27.41 kg/m²     General appearance - alert, well appearing, and in no distress  Chest -clear air entry noted in bases, no wheezes  Heart - S1 and S2 normal  Abdomen - soft, nontender, nondistended, Bowel sounds present, suprapubic catheter in situ  Neurological - alert, oriented, normal speech, no focal findings noted  Extremities - no pedal edema noted    ASSESSMENT:    1.  Bilateral hydroureteronephrosis  2. Urinary retention due to urinary stricture status post suprapubic catheter  3. History of prostate cancer  4. History of colon cancer status post resections and pelvic radiation  5. Gastric ulcer with recent GI bleed  6. Dark stool, improving. Stable H&H currently. 7.  Coronary artery disease  8. Hypertension  9. Dyslipidemia  10. Constipation, improved  11. UTI    PLAN:    Continue current management  Urology input noted about urological procedure on Monday  Nothing to add from my point of view today  Continue following urine culture report    Disclaimer: Sections of this note are dictated using utilizing voice recognition software, which may have resulted in some phonetic based errors in grammar and contents. Even though attempts were made to correct all the mistakes, some may have been missed, and remained in the body of the document. If questions arise, please contact our department.     Recent Results (from the past 24 hour(s))   HGB & HCT    Collection Time: 06/20/20  7:45 PM   Result Value Ref Range    HGB 11.2 (L) 13.0 - 16.0 g/dL    HCT 33.5 (L) 36.0 - 48.0 %   CBC WITH AUTOMATED DIFF    Collection Time: 06/21/20  5:23 AM   Result Value Ref Range    WBC 6.4 4.6 - 13.2 K/uL    RBC 3.84 (L) 4.70 - 5.50 M/uL    HGB 10.7 (L) 13.0 - 16.0 g/dL    HCT 33.2 (L) 36.0 - 48.0 %    MCV 86.5 74.0 - 97.0 FL    MCH 27.9 24.0 - 34.0 PG    MCHC 32.2 31.0 - 37.0 g/dL    RDW 13.6 11.6 - 14.5 %    PLATELET 907 805 - 672 K/uL    MPV 9.7 9.2 - 11.8 FL    NEUTROPHILS 58 40 - 73 %    LYMPHOCYTES 28 21 - 52 %    MONOCYTES 10 3 - 10 %    EOSINOPHILS 3 0 - 5 %    BASOPHILS 1 0 - 2 %    ABS. NEUTROPHILS 3.7 1.8 - 8.0 K/UL    ABS. LYMPHOCYTES 1.8 0.9 - 3.6 K/UL    ABS. MONOCYTES 0.7 0.05 - 1.2 K/UL    ABS. EOSINOPHILS 0.2 0.0 - 0.4 K/UL    ABS.  BASOPHILS 0.1 0.0 - 0.1 K/UL    DF AUTOMATED     METABOLIC PANEL, BASIC    Collection Time: 06/21/20  5:23 AM   Result Value Ref Range    Sodium 137 136 - 145 mmol/L    Potassium 4.5 3.5 - 5.5 mmol/L    Chloride 109 100 - 111 mmol/L    CO2 23 21 - 32 mmol/L    Anion gap 5 3.0 - 18 mmol/L    Glucose 90 74 - 99 mg/dL    BUN 11 7.0 - 18 MG/DL    Creatinine 0.56 (L) 0.6 - 1.3 MG/DL    BUN/Creatinine ratio 20 12 - 20      GFR est AA >60 >60 ml/min/1.73m2    GFR est non-AA >60 >60 ml/min/1.73m2    Calcium 9.2 8.5 - 10.1 MG/DL

## 2020-06-21 NOTE — PROGRESS NOTES
Bedside and Verbal shift change report given to Laurel Benitez RN (oncoming nurse) by Annette Carbajal RN (offgoing nurse). Report included the following information SBAR and Kardex.

## 2020-06-21 NOTE — PROGRESS NOTES
Problem: Falls - Risk of  Goal: *Absence of Falls  Description: Document [place] Fall Risk and appropriate interventions in the flowsheet.   Outcome: Progressing Towards Goal  Note: Fall Risk Interventions:    Medication Interventions: Teach patient to arise slowly, Patient to call before getting OOB    Elimination Interventions: Call light in reach    Problem: Patient Education: Go to Patient Education Activity  Goal: Patient/Family Education  Outcome: Progressing Towards Goal

## 2020-06-22 ENCOUNTER — ANESTHESIA (OUTPATIENT)
Dept: SURGERY | Age: 75
DRG: 660 | End: 2020-06-22
Payer: MEDICARE

## 2020-06-22 ENCOUNTER — APPOINTMENT (OUTPATIENT)
Dept: GENERAL RADIOLOGY | Age: 75
DRG: 660 | End: 2020-06-22
Attending: UROLOGY
Payer: MEDICARE

## 2020-06-22 PROBLEM — N13.30 HYDRONEPHROSIS: Status: ACTIVE | Noted: 2020-06-22

## 2020-06-22 LAB
COVID-19 RAPID TEST, COVR: NOT DETECTED
HCT VFR BLD AUTO: 34 % (ref 36–48)
HGB BLD-MCNC: 10.9 G/DL (ref 13–16)
SOURCE, COVRS: NORMAL

## 2020-06-22 PROCEDURE — 74011000258 HC RX REV CODE- 258: Performed by: INTERNAL MEDICINE

## 2020-06-22 PROCEDURE — 74011250637 HC RX REV CODE- 250/637: Performed by: INTERNAL MEDICINE

## 2020-06-22 PROCEDURE — 77030038846 HC SCPE URETSCP LITHOVUE DISP BSC -H: Performed by: UROLOGY

## 2020-06-22 PROCEDURE — 77030008683 HC TU ET CUF COVD -A: Performed by: ANESTHESIOLOGY

## 2020-06-22 PROCEDURE — 0TB64ZX EXCISION OF RIGHT URETER, PERCUTANEOUS ENDOSCOPIC APPROACH, DIAGNOSTIC: ICD-10-PCS | Performed by: UROLOGY

## 2020-06-22 PROCEDURE — 74011250636 HC RX REV CODE- 250/636: Performed by: UROLOGY

## 2020-06-22 PROCEDURE — 77030011265 HC ELECTRD BLD HEX COVD -A: Performed by: UROLOGY

## 2020-06-22 PROCEDURE — 74011250636 HC RX REV CODE- 250/636: Performed by: NURSE ANESTHETIST, CERTIFIED REGISTERED

## 2020-06-22 PROCEDURE — 77030034696 HC CATH URETH FOL 2W BARD -A: Performed by: UROLOGY

## 2020-06-22 PROCEDURE — 85018 HEMOGLOBIN: CPT

## 2020-06-22 PROCEDURE — 99218 HC RM OBSERVATION: CPT

## 2020-06-22 PROCEDURE — 77030019908 HC STETH ESOPH SIMS -A: Performed by: ANESTHESIOLOGY

## 2020-06-22 PROCEDURE — 77030040922 HC BLNKT HYPOTHRM STRY -A: Performed by: UROLOGY

## 2020-06-22 PROCEDURE — 74011250636 HC RX REV CODE- 250/636

## 2020-06-22 PROCEDURE — 74011250636 HC RX REV CODE- 250/636: Performed by: INTERNAL MEDICINE

## 2020-06-22 PROCEDURE — 0T7C8ZZ DILATION OF BLADDER NECK, VIA NATURAL OR ARTIFICIAL OPENING ENDOSCOPIC: ICD-10-PCS | Performed by: UROLOGY

## 2020-06-22 PROCEDURE — 0T788DZ DILATION OF BILATERAL URETERS WITH INTRALUMINAL DEVICE, VIA NATURAL OR ARTIFICIAL OPENING ENDOSCOPIC: ICD-10-PCS | Performed by: UROLOGY

## 2020-06-22 PROCEDURE — 76210000006 HC OR PH I REC 0.5 TO 1 HR: Performed by: UROLOGY

## 2020-06-22 PROCEDURE — 88300 SURGICAL PATH GROSS: CPT

## 2020-06-22 PROCEDURE — BT14ZZZ FLUOROSCOPY OF KIDNEYS, URETERS AND BLADDER: ICD-10-PCS | Performed by: UROLOGY

## 2020-06-22 PROCEDURE — 0T5B8ZZ DESTRUCTION OF BLADDER, VIA NATURAL OR ARTIFICIAL OPENING ENDOSCOPIC: ICD-10-PCS | Performed by: UROLOGY

## 2020-06-22 PROCEDURE — 77030006974 HC BSKT URET RTVR BSC -C: Performed by: UROLOGY

## 2020-06-22 PROCEDURE — 0TP98DZ REMOVAL OF INTRALUMINAL DEVICE FROM URETER, VIA NATURAL OR ARTIFICIAL OPENING ENDOSCOPIC: ICD-10-PCS | Performed by: UROLOGY

## 2020-06-22 PROCEDURE — 65270000029 HC RM PRIVATE

## 2020-06-22 PROCEDURE — C2617 STENT, NON-COR, TEM W/O DEL: HCPCS | Performed by: UROLOGY

## 2020-06-22 PROCEDURE — 0TBB8ZX EXCISION OF BLADDER, VIA NATURAL OR ARTIFICIAL OPENING ENDOSCOPIC, DIAGNOSTIC: ICD-10-PCS | Performed by: UROLOGY

## 2020-06-22 PROCEDURE — C1769 GUIDE WIRE: HCPCS | Performed by: UROLOGY

## 2020-06-22 PROCEDURE — 77030025764 HC FCPS BIOP ENDOSC BSC -D: Performed by: UROLOGY

## 2020-06-22 PROCEDURE — 0T2BX0Z CHANGE DRAINAGE DEVICE IN BLADDER, EXTERNAL APPROACH: ICD-10-PCS | Performed by: UROLOGY

## 2020-06-22 PROCEDURE — C1894 INTRO/SHEATH, NON-LASER: HCPCS | Performed by: UROLOGY

## 2020-06-22 PROCEDURE — 87635 SARS-COV-2 COVID-19 AMP PRB: CPT

## 2020-06-22 PROCEDURE — 76060000034 HC ANESTHESIA 1.5 TO 2 HR: Performed by: UROLOGY

## 2020-06-22 PROCEDURE — 77030019927 HC TBNG IRR CYSTO BAXT -A: Performed by: UROLOGY

## 2020-06-22 PROCEDURE — 77030012961 HC IRR KT CYSTO/TUR ICUM -A: Performed by: UROLOGY

## 2020-06-22 PROCEDURE — 77030026438 HC STYL ET INTUB CARD -A: Performed by: ANESTHESIOLOGY

## 2020-06-22 PROCEDURE — 74011000636 HC RX REV CODE- 636: Performed by: UROLOGY

## 2020-06-22 PROCEDURE — 77030013553 HC DIL URETH MEATAL COOK -C: Performed by: UROLOGY

## 2020-06-22 PROCEDURE — 74011250637 HC RX REV CODE- 250/637: Performed by: FAMILY MEDICINE

## 2020-06-22 PROCEDURE — 88305 TISSUE EXAM BY PATHOLOGIST: CPT

## 2020-06-22 PROCEDURE — 77030018836 HC SOL IRR NACL ICUM -A: Performed by: UROLOGY

## 2020-06-22 PROCEDURE — 74420 UROGRAPHY RTRGR +-KUB: CPT

## 2020-06-22 PROCEDURE — 74011000250 HC RX REV CODE- 250: Performed by: NURSE ANESTHETIST, CERTIFIED REGISTERED

## 2020-06-22 PROCEDURE — 77030040361 HC SLV COMPR DVT MDII -B: Performed by: UROLOGY

## 2020-06-22 PROCEDURE — 36415 COLL VENOUS BLD VENIPUNCTURE: CPT

## 2020-06-22 PROCEDURE — 76010000153 HC OR TIME 1.5 TO 2 HR: Performed by: UROLOGY

## 2020-06-22 DEVICE — URETERAL STENT
Type: IMPLANTABLE DEVICE | Site: URETER | Status: FUNCTIONAL
Brand: POLARIS™ ULTRA

## 2020-06-22 RX ORDER — GLYCOPYRROLATE 0.2 MG/ML
INJECTION INTRAMUSCULAR; INTRAVENOUS AS NEEDED
Status: DISCONTINUED | OUTPATIENT
Start: 2020-06-22 | End: 2020-06-22 | Stop reason: HOSPADM

## 2020-06-22 RX ORDER — SODIUM CHLORIDE, SODIUM LACTATE, POTASSIUM CHLORIDE, CALCIUM CHLORIDE 600; 310; 30; 20 MG/100ML; MG/100ML; MG/100ML; MG/100ML
75 INJECTION, SOLUTION INTRAVENOUS CONTINUOUS
Status: DISCONTINUED | OUTPATIENT
Start: 2020-06-22 | End: 2020-06-22 | Stop reason: HOSPADM

## 2020-06-22 RX ORDER — NEOSTIGMINE METHYLSULFATE 1 MG/ML
INJECTION, SOLUTION INTRAVENOUS AS NEEDED
Status: DISCONTINUED | OUTPATIENT
Start: 2020-06-22 | End: 2020-06-22 | Stop reason: HOSPADM

## 2020-06-22 RX ORDER — PROPOFOL 10 MG/ML
INJECTION, EMULSION INTRAVENOUS AS NEEDED
Status: DISCONTINUED | OUTPATIENT
Start: 2020-06-22 | End: 2020-06-22 | Stop reason: HOSPADM

## 2020-06-22 RX ORDER — ONDANSETRON 2 MG/ML
4 INJECTION INTRAMUSCULAR; INTRAVENOUS ONCE
Status: COMPLETED | OUTPATIENT
Start: 2020-06-22 | End: 2020-06-22

## 2020-06-22 RX ORDER — SODIUM CHLORIDE 0.9 % (FLUSH) 0.9 %
5-40 SYRINGE (ML) INJECTION AS NEEDED
Status: DISCONTINUED | OUTPATIENT
Start: 2020-06-22 | End: 2020-06-22 | Stop reason: HOSPADM

## 2020-06-22 RX ORDER — LIDOCAINE HYDROCHLORIDE 20 MG/ML
INJECTION, SOLUTION EPIDURAL; INFILTRATION; INTRACAUDAL; PERINEURAL AS NEEDED
Status: DISCONTINUED | OUTPATIENT
Start: 2020-06-22 | End: 2020-06-22 | Stop reason: HOSPADM

## 2020-06-22 RX ORDER — ROCURONIUM BROMIDE 10 MG/ML
INJECTION, SOLUTION INTRAVENOUS AS NEEDED
Status: DISCONTINUED | OUTPATIENT
Start: 2020-06-22 | End: 2020-06-22 | Stop reason: HOSPADM

## 2020-06-22 RX ORDER — SODIUM CHLORIDE 0.9 % (FLUSH) 0.9 %
5-40 SYRINGE (ML) INJECTION EVERY 8 HOURS
Status: DISCONTINUED | OUTPATIENT
Start: 2020-06-22 | End: 2020-06-22 | Stop reason: HOSPADM

## 2020-06-22 RX ORDER — SUCCINYLCHOLINE CHLORIDE 20 MG/ML
INJECTION INTRAMUSCULAR; INTRAVENOUS AS NEEDED
Status: DISCONTINUED | OUTPATIENT
Start: 2020-06-22 | End: 2020-06-22 | Stop reason: HOSPADM

## 2020-06-22 RX ORDER — ALBUTEROL SULFATE 0.83 MG/ML
2.5 SOLUTION RESPIRATORY (INHALATION) AS NEEDED
Status: DISCONTINUED | OUTPATIENT
Start: 2020-06-22 | End: 2020-06-22 | Stop reason: HOSPADM

## 2020-06-22 RX ORDER — ONDANSETRON 2 MG/ML
INJECTION INTRAMUSCULAR; INTRAVENOUS AS NEEDED
Status: DISCONTINUED | OUTPATIENT
Start: 2020-06-22 | End: 2020-06-22 | Stop reason: HOSPADM

## 2020-06-22 RX ORDER — CIPROFLOXACIN 2 MG/ML
400 INJECTION, SOLUTION INTRAVENOUS
Status: DISCONTINUED | OUTPATIENT
Start: 2020-06-22 | End: 2020-06-23

## 2020-06-22 RX ORDER — DIPHENHYDRAMINE HYDROCHLORIDE 50 MG/ML
12.5 INJECTION, SOLUTION INTRAMUSCULAR; INTRAVENOUS
Status: DISCONTINUED | OUTPATIENT
Start: 2020-06-22 | End: 2020-06-22 | Stop reason: HOSPADM

## 2020-06-22 RX ORDER — FAMOTIDINE 20 MG/1
20 TABLET, FILM COATED ORAL ONCE
Status: DISCONTINUED | OUTPATIENT
Start: 2020-06-22 | End: 2020-06-22 | Stop reason: HOSPADM

## 2020-06-22 RX ORDER — FLUCONAZOLE 2 MG/ML
400 INJECTION, SOLUTION INTRAVENOUS
Status: DISCONTINUED | OUTPATIENT
Start: 2020-06-22 | End: 2020-06-22

## 2020-06-22 RX ORDER — INSULIN LISPRO 100 [IU]/ML
INJECTION, SOLUTION INTRAVENOUS; SUBCUTANEOUS ONCE
Status: DISCONTINUED | OUTPATIENT
Start: 2020-06-22 | End: 2020-06-22 | Stop reason: HOSPADM

## 2020-06-22 RX ORDER — HYDROMORPHONE HYDROCHLORIDE 2 MG/ML
0.5 INJECTION, SOLUTION INTRAMUSCULAR; INTRAVENOUS; SUBCUTANEOUS
Status: DISCONTINUED | OUTPATIENT
Start: 2020-06-22 | End: 2020-06-22 | Stop reason: HOSPADM

## 2020-06-22 RX ORDER — CIPROFLOXACIN 2 MG/ML
400 INJECTION, SOLUTION INTRAVENOUS EVERY 12 HOURS
Status: DISCONTINUED | OUTPATIENT
Start: 2020-06-22 | End: 2020-06-23

## 2020-06-22 RX ORDER — SODIUM CHLORIDE, SODIUM LACTATE, POTASSIUM CHLORIDE, CALCIUM CHLORIDE 600; 310; 30; 20 MG/100ML; MG/100ML; MG/100ML; MG/100ML
50 INJECTION, SOLUTION INTRAVENOUS CONTINUOUS
Status: DISCONTINUED | OUTPATIENT
Start: 2020-06-22 | End: 2020-06-22 | Stop reason: HOSPADM

## 2020-06-22 RX ORDER — EPHEDRINE SULFATE/0.9% NACL/PF 50 MG/5 ML
SYRINGE (ML) INTRAVENOUS AS NEEDED
Status: DISCONTINUED | OUTPATIENT
Start: 2020-06-22 | End: 2020-06-22 | Stop reason: HOSPADM

## 2020-06-22 RX ORDER — FENTANYL CITRATE 50 UG/ML
INJECTION, SOLUTION INTRAMUSCULAR; INTRAVENOUS AS NEEDED
Status: DISCONTINUED | OUTPATIENT
Start: 2020-06-22 | End: 2020-06-22 | Stop reason: HOSPADM

## 2020-06-22 RX ORDER — CIPROFLOXACIN 2 MG/ML
400 INJECTION, SOLUTION INTRAVENOUS ONCE
Status: DISCONTINUED | OUTPATIENT
Start: 2020-06-22 | End: 2020-06-22

## 2020-06-22 RX ORDER — LIDOCAINE HYDROCHLORIDE 10 MG/ML
0.1 INJECTION, SOLUTION EPIDURAL; INFILTRATION; INTRACAUDAL; PERINEURAL AS NEEDED
Status: DISCONTINUED | OUTPATIENT
Start: 2020-06-22 | End: 2020-06-22 | Stop reason: HOSPADM

## 2020-06-22 RX ORDER — SODIUM CHLORIDE, SODIUM LACTATE, POTASSIUM CHLORIDE, CALCIUM CHLORIDE 600; 310; 30; 20 MG/100ML; MG/100ML; MG/100ML; MG/100ML
INJECTION, SOLUTION INTRAVENOUS
Status: DISCONTINUED | OUTPATIENT
Start: 2020-06-22 | End: 2020-06-22 | Stop reason: HOSPADM

## 2020-06-22 RX ORDER — NALOXONE HYDROCHLORIDE 0.4 MG/ML
0.04 INJECTION, SOLUTION INTRAMUSCULAR; INTRAVENOUS; SUBCUTANEOUS AS NEEDED
Status: DISCONTINUED | OUTPATIENT
Start: 2020-06-22 | End: 2020-06-22 | Stop reason: HOSPADM

## 2020-06-22 RX ORDER — FENTANYL CITRATE 50 UG/ML
50 INJECTION, SOLUTION INTRAMUSCULAR; INTRAVENOUS
Status: DISCONTINUED | OUTPATIENT
Start: 2020-06-22 | End: 2020-06-22 | Stop reason: HOSPADM

## 2020-06-22 RX ADMIN — GLYCOPYRROLATE 0.3 MG: 0.2 INJECTION INTRAMUSCULAR; INTRAVENOUS at 19:01

## 2020-06-22 RX ADMIN — PANTOPRAZOLE SODIUM 40 MG: 20 TABLET, DELAYED RELEASE ORAL at 16:38

## 2020-06-22 RX ADMIN — CEFTAZIDIME 2 G: 1 INJECTION, POWDER, FOR SOLUTION INTRAMUSCULAR; INTRAVENOUS at 22:30

## 2020-06-22 RX ADMIN — FENTANYL CITRATE 100 MCG: 50 INJECTION, SOLUTION INTRAMUSCULAR; INTRAVENOUS at 17:30

## 2020-06-22 RX ADMIN — ONDANSETRON 4 MG: 2 INJECTION INTRAMUSCULAR; INTRAVENOUS at 20:11

## 2020-06-22 RX ADMIN — CEFTAZIDIME 1 G: 1 INJECTION, POWDER, FOR SOLUTION INTRAMUSCULAR; INTRAVENOUS at 06:44

## 2020-06-22 RX ADMIN — DEXTROSE MONOHYDRATE AND SODIUM CHLORIDE 75 ML/HR: 5; .9 INJECTION, SOLUTION INTRAVENOUS at 20:58

## 2020-06-22 RX ADMIN — SUCCINYLCHOLINE CHLORIDE 140 MG: 20 INJECTION, SOLUTION INTRAMUSCULAR; INTRAVENOUS at 17:38

## 2020-06-22 RX ADMIN — LIDOCAINE HYDROCHLORIDE 100 MG: 20 INJECTION, SOLUTION EPIDURAL; INFILTRATION; INTRACAUDAL; PERINEURAL at 17:38

## 2020-06-22 RX ADMIN — ACETAMINOPHEN 650 MG: 325 TABLET ORAL at 20:27

## 2020-06-22 RX ADMIN — Medication 2 MG: at 19:01

## 2020-06-22 RX ADMIN — PROPOFOL 100 MG: 10 INJECTION, EMULSION INTRAVENOUS at 17:38

## 2020-06-22 RX ADMIN — ROCURONIUM BROMIDE 5 MG: 50 INJECTION INTRAVENOUS at 17:38

## 2020-06-22 RX ADMIN — SODIUM CHLORIDE, SODIUM LACTATE, POTASSIUM CHLORIDE, AND CALCIUM CHLORIDE 50 ML/HR: 600; 310; 30; 20 INJECTION, SOLUTION INTRAVENOUS at 20:12

## 2020-06-22 RX ADMIN — FENTANYL CITRATE 50 MCG: 50 INJECTION, SOLUTION INTRAMUSCULAR; INTRAVENOUS at 18:23

## 2020-06-22 RX ADMIN — CEFTAZIDIME 2 G: 1 INJECTION, POWDER, FOR SOLUTION INTRAMUSCULAR; INTRAVENOUS at 17:44

## 2020-06-22 RX ADMIN — CIPROFLOXACIN 400 MG: 2 INJECTION, SOLUTION INTRAVENOUS at 17:44

## 2020-06-22 RX ADMIN — SODIUM CHLORIDE 1 G: 900 INJECTION, SOLUTION INTRAVENOUS at 17:44

## 2020-06-22 RX ADMIN — ROCURONIUM BROMIDE 10 MG: 50 INJECTION INTRAVENOUS at 18:16

## 2020-06-22 RX ADMIN — SODIUM CHLORIDE, SODIUM LACTATE, POTASSIUM CHLORIDE, AND CALCIUM CHLORIDE: 600; 310; 30; 20 INJECTION, SOLUTION INTRAVENOUS at 17:25

## 2020-06-22 RX ADMIN — Medication 30 MG: at 17:53

## 2020-06-22 RX ADMIN — FENTANYL CITRATE 50 MCG: 50 INJECTION, SOLUTION INTRAMUSCULAR; INTRAVENOUS at 18:32

## 2020-06-22 RX ADMIN — ONDANSETRON 4 MG: 2 INJECTION INTRAMUSCULAR; INTRAVENOUS at 19:01

## 2020-06-22 NOTE — PROGRESS NOTES
Dr Rock Swann removed a morgan catheter at the beginning of the OR procedure that was not listed in the LDA's. Also removed a pre-existing suprapubic catheter from lower mid abdomen that was not listed on the LDA's.

## 2020-06-22 NOTE — PROGRESS NOTES
Problem: Falls - Risk of  Goal: *Absence of Falls  Description: Document Maria Elena Moncure Fall Risk and appropriate interventions in the flowsheet.   Outcome: Progressing Towards Goal  Note: Fall Risk Interventions:            Medication Interventions: Patient to call before getting OOB    Elimination Interventions: Call light in reach              Problem: Patient Education: Go to Patient Education Activity  Goal: Patient/Family Education  Outcome: Progressing Towards Goal     Problem: Pain  Goal: *Control of Pain  Outcome: Progressing Towards Goal     Problem: Patient Education: Go to Patient Education Activity  Goal: Patient/Family Education  Outcome: Progressing Towards Goal

## 2020-06-22 NOTE — PROGRESS NOTES
Reason for Admission:  Bilateral hydronephrosis [N13.30]                 RRAT Score:    11%            Plan for utilizing home health:    no                      Likelihood of Readmission:   LOW                         Transition of Care Plan:              Initial assessment completed with patient. Cognitive status of patient: oriented to time, place, person and situation. Face sheet information confirmed:  yes. The patient designates spouse, Terra Dubose to participate in his discharge plan and to receive any needed information. This patient lives in a single family home with wife. Patient is able to navigate steps as needed. Prior to hospitalization, patient was considered to be independent with ADLs/IADLS : yes . Patient has a current ACP document on file: no  The spouse will be available to transport patient home upon discharge. The patient has no DME available in the home. Patient is not currently active with home health. Patient has not stayed in a skilled nursing facility or rehab. This patient is on dialysis :no    Currently, the discharge plan is Home. The patient states that he can obtain his medications from the pharmacy, and take his medications as directed. Patient's current insurance is Medicare and [facility]       Care Management Interventions  PCP Verified by CM:  Yes  Mode of Transport at Discharge: [address] (CM Consult): Discharge Planning  Current Support Network: Lives with Spouse  Confirm Follow Up Transport: Family  The Patient and/or Patient Representative was Provided with a Choice of Provider and Agrees with the Discharge Plan?: No  Freedom of Choice List was Provided with Basic Dialogue that Supports the Patient's Individualized Plan of Care/Goals, Treatment Preferences and Shares the Quality Data Associated with the Providers?: No  Discharge Location  Discharge Placement: Home        Moreno Vasques RN - Outcomes Manager  [phone]

## 2020-06-22 NOTE — OP NOTES
Operative Note  Patient: Yonas Figueredo               Sex: male             MRN: [id number]  YOB: 1945      Age:  66 y.o. Preoperative Diagnosis: N13.30 HYDRONEPHROSIS  C61 PROSTATE CANCER  N32.0 BLADDER NECK CONTRACTURE  Postoperative Diagnosis:  N13.30 HYDRONEPHROSIS  C61 PROSTATE CANCER  Surgeon: Vincent Pan     Indication: This is a 65 y/o man with bilateral distal ureteral obstruction of uncertain etiology, bilateral stents, hx of [place] CaP s/p cryo with undetectable PSA, history of pelvic radiation for colon cancer, history of AUS, erosion, explant and BNC that is dense living with chronic SP tube here today for cystoscopy, bilateral diagnostic ureteroscopy, bladder biopsy, bilateral stent exchange and SP tube change. Procedure:    1) Cystoscopy  2) Retrograde pyelogram with interpretation  3) < 1 hour physician fluoroscopy imaging interpretation time  4) Bilateral diagnostic Ureteroscopy and right ureteral biopsy   5) Bilateral JJ ureteral stent exchange   6). Bladder biopsy and fulguration  7). Bladder neck dilation to allow distal ureteral access  8). SP tube exchange     Findings:    1). No obvious bladder tumors, bullous edema, bladder biopsy and fulguration performed   2). Stents un encrusted  3). Bilateral ureteroscopy revealed distal ureteral strictures, starting at iliacs on right, just distal on left  4). Bilateral JJ stent exchanged   5). Bladder neck dilated to 22fr to allow passage of semirigid ureteroscope into distal ureters   6). 25 Fr Sp tube exchanged     Narrative of events: The patient was brought to the operative suite. Anesthesia was induced and preoperative antibiotics were administered. They were then placed in the dorsal lithotomy position and their external genitalia was prepped and draped in the usual fashion. A surgical timeout was performed confirming the patient's name, date of birth, laterality, and antibiotics. All were in agreement.  A 22 [place] cystourethroscope was then inserted into the patients bladder. Encountered a dense urethral stricture. We then accessed the SP tube site with flexible scope and passed a wire from above for through and through access. This was then dilated to 22fr. The 19fr cystoscope was advanced, but could not be maneuvered around the bladder due to extreme prostatic scarring, the entire urethra/prostate was fixed and immobile. The scope was left in a semirigid ureteroscope was advanced through the 19fr sheath. Meanwhile from above, both stents were grasped and brought out and exchanged for wires. I then performed distal ureteroscopy following the wire and was able to a good view of most of the ureter. I passed a wire through the scope, backed it out, then advanced a 10/12 access sheath and performed flexible ureterosocopy. It was easier to visualize the ureter this way. On the right side there was a dense stricture starting at the iliacs all the way to the bladder. A segment was biopsied, but visually appeared benign. Same was seen on the left except just the very distal most segment was involved. Both stents were then exchanged for new 6x26 JJ stents. A biopsy of the trigone was performed from above and bugbee was used to fulgurate. A new 22fr morgan was placed via SP tube site and 5cc in the balloon. The urethra was abandoned. A larger catheter will facilitate further stent exchanges in the future hopefully via the Sp tube site. The bladder was drained and patient was awoken from anesthesia. Estimated Blood Loss: <5CC     Anesthesia:  General                  Implants:   Implant Name Type Inv.  Item Serial No.  Lot No. LRB No. Used Lisa Shin DBL-PGTL [id number] Santos Shin DBL-PGTL [id number] -- [employer] [facility] [id number] Left 1 Implanted   STENT URET DBL-PGTL [id number] -- POLARIS - [id number]  STENT URET DBL-PGTL [id number] -- ANA  [facility] UROLOGY-WOMENS [facility] [id number] Right 1 Implanted       Specimens:   ID Type Source Tests Collected by Time Destination   1 : right distal uterteral biopsy Preservative URETER, RIGHT  Sunny Norton MD 6/22/2020  6:22 PM Pathology   2 : bladder biopsy Preservative Bladder  Sunny Norton MD 6/22/2020  6:57 PM Pathology        Drains: Bilateral 6x26JJ stents, 22fr SP tube            Plan:  1. Observe overnight  2. Home tomorrow (likely no need for homegoing abx from urologic standpoint, defer to ID)  3.  Follow up in 3 months to discuss next stent exchange     Bebeto Ugalde MD  6/22/2020

## 2020-06-22 NOTE — CONSULTS
Infectious Disease Consultation Note      Reason: pseudomonas, stenotrophomonas UTI, need for urological procedure; pcn/sulfa allergy    Current abx Prior abx   Ceftazidime since 6/19      Lines:       Assessment :    66 y.o. male with PMHx of bilateral hydroureteronephrosis, urinary retention, urinary stricture, prostate CA, colon CA s/p resection and pelvic radiation, CAD, MI, HTN, HLD, gastric ulcer and chronic constipation who is a direct admit to the hospital for bilateral hydroureternephrosis of unclear etiology admitted to [facility] on 6/19/20 for urological procedure     Urine cultures 4/22-stenotrophomonas intermediately susceptible to ceftazidime, resistant to levofloxacin  Urine culture 6/10-stenotrophomonas, Pseudomonas  Urine culture 6/19-40,000 colonies of gram-negative rods    Clinical presentation consistent with partially treated stenotrophomonas, Pseudomonas cystitis. Patient in need for urological intervention. Antibiotic management complicated by documented history of penicillin, Bactrim allergy. Discussed with the patient he had hives with penicillin. Currently tolerating ceftazidime without difficulties. He does not recollect nature of Bactrim allergy. However does not think he had life-threatening allergy to Bactrim. Recommendations:    1. Continue ceftazidime. Recommend to add ciprofloxacin perioperatively since patient is colonized with stenotrophomonas intermediately susceptible to ceftazidime as seen in the urine cultures April 2020  2. F/u Intraoperative findings to determine duration of antibiotic therapy  3. F/u identification and susceptibility of gram-negative jemima in urine culture 6/19    Thank you for consultation request. Above plan was discussed in details with patient, and dr Yolanda Aguilar. Please call me if any further questions or concerns. Will continue to participate in the care of this patient.   HPI:    66 y.o. male with PMHx of bilateral hydroureteronephrosis, urinary retention, urinary stricture, prostate CA, colon CA s/p resection and pelvic radiation, CAD, MI, HTN, HLD, gastric ulcer and chronic constipation who is a direct admit to the hospital for bilateral hydroureternephrosis of unclear etiology who is scheduled for cystoscopy, urethral dilation, possible TURBT and bilateral diagnostic ureteroscopy. He has a suprapubic catheter that he's had for 6-8 years and was placed after prostate CA and colon CA. Reports he was started on a PO abx for UTI but the culture results came back showing he needed IV abx. Urine Cx from 6/10/20 show Pseudomonas and Stenotrophomonas both sensitive to ceftazidime. He was admitted to [facility] on 6/19. Started on intravenous ceftazidime. Urine culture 6/19 reveals 40,000 colonies of gram-negative rods. I have been consulted for further recommendations. Currently patient states that he feels fine. He denies any flank pain. Patient denies headaches, visual disturbances, sore throat, runny nose, earaches, cp, sob, chills, cough, abdominal pain, diarrhea, burning micturition, pain or weakness in extremities. He denies back pain/flank pain. He denies recent sick contacts. No h/o recent travel.  No known h/o MRSA colonization or infection in the past.        Past Medical History:   Diagnosis Date    CAD (coronary artery disease)     Colon cancer ([address] 1978    [place]    Elevated cholesterol     Elevated PSA 3/21/13    Dr. Vivi Olvera Erectile dysfunction     Exertional dyspnea     Groin pain, chronic, right     Hypertension     Incontinence     Infection of prosthesis ([address]     Kidney stone     MI (myocardial infarction) ([address] 2012    Nonspecific abnormal electrocardiogram (ECG) (EKG)     Prostate cancer (HCC)     cT1c aidee 4 + 5 adenocarcinoma of the prostate s/p cryoablation 9/9/13    Stress incontinence     Traumatic membranous urethral stricture     Ulcer 2010    [facility]    Unspecified constipation     Urethral stricture  Urinary incontinence     Urinary retention     Urinary retention 2/22/2017    Urinary tract infection with hematuria        Past Surgical History:   Procedure Laterality Date    HX COLECTOMY  1978    HX CORONARY ARTERY BYPASS GRAFT  2010    CABG. .. triple    in [facility]    HX CRYOABLATION OF THE PROSTATE  9/9/13    HX HERNIA REPAIR      right   Dorothy Gotti [facility]  [id number]    cystoscopy, laser cystolitholapaxy    HX UROLOGICAL  6/2015    pump in bladder    HX UROLOGICAL  6/17/16    Explant urinary sphincter    HX UROLOGICAL  12/28/2016    reddy awad, Cystourethroscopy, Urethral dilation, Suprapubic catheter placement       home Medication List       Details   amLODIPine (NORVASC) 2.5 mg tablet take 1 tablet by mouth once daily      losartan (COZAAR) 50 mg tablet take 1 tablet by mouth twice a day      omeprazole (PRILOSEC) 20 mg capsule take 1 capsule by mouth twice a day for 30 DAYS THEN TAKE 1 once daily THEREAFTER      sodium chloride irrigation (Sterile Saline) 0.9 % irrigation Use PRN for irrigation flushes. Qty: 2000 mL, Refills: 12      Urinary Bag (BARD DISPOZ-A-BAG-FLIP-ARGENIS VLV) misc 10 Bags by Does Not Apply route as needed for Other. Qty: 3 Each, Refills: 3      metoprolol (LOPRESSOR) 100 mg tablet 50 mg at bedtime as directed for dose pack. cyanocobalamin (VITAMIN B-12) 500 mcg tablet Take 500 mcg by mouth daily. atorvastatin (LIPITOR) 40 mg tablet Take 40 mg by mouth daily. ergocalciferol (VITAMIN D2) 50,000 unit capsule Take 50,000 Units by mouth every seven (7) days. aspirin 81 mg tablet Take 81 mg by mouth at bedtime as directed for dose pack.              Current Facility-Administered Medications   Medication Dose Route Frequency    cefTAZidime (FORTAZ) 2 g in 0.9% sodium chloride 50 mL IVPB  2 g IntraVENous ON CALL TO OR    vancomycin (VANCOCIN) 1,000 mg in 0.9% sodium chloride (MBP/ADV) 250 mL adv  1,000 mg IntraVENous ON CALL TO OR    cefTAZidime (FORTAZ) 2 g in 0.9% sodium chloride 50 mL IVPB  2 g IntraVENous Q8H    ciprofloxacin (CIPRO) 400 mg in D5W IVPB (premix)  400 mg IntraVENous Q12H    ciprofloxacin (CIPRO) 400 mg in D5W IVPB (premix)  400 mg IntraVENous ON CALL TO OR    dextrose 5% and 0.9% NaCl infusion  75 mL/hr IntraVENous CONTINUOUS    amLODIPine (NORVASC) tablet 10 mg  10 mg Oral DAILY    pantoprazole (PROTONIX) tablet 40 mg  40 mg Oral ACB&D    senna-docusate (PERICOLACE) 8.6-50 mg per tablet 1 Tab  1 Tab Oral DAILY    polyethylene glycol (MIRALAX) packet 17 g  17 g Oral DAILY PRN    ondansetron (ZOFRAN) injection 4 mg  4 mg IntraVENous Q4H PRN    cyanocobalamin (VITAMIN B12) tablet 500 mcg  500 mcg Oral DAILY    ergocalciferol capsule 50,000 Units  50,000 Units Oral Q7D    metoprolol tartrate (LOPRESSOR) tablet 50 mg  50 mg Oral QHS    acetaminophen (TYLENOL) tablet 650 mg  650 mg Oral Q4H PRN    atorvastatin (LIPITOR) tablet 40 mg  40 mg Oral DAILY    losartan (COZAAR) tablet 50 mg  50 mg Oral BID    hydrALAZINE (APRESOLINE) 20 mg/mL injection 10 mg  10 mg IntraVENous Q6H PRN       Allergies: Penicillins and Bactrim [sulfamethoxazole-trimethoprim]    Family History   Problem Relation Age of Onset    Asthma Sister     Cancer Other         uncle, prostate     Social History     Socioeconomic History    Marital status:      Spouse name: Not on file    Number of children: Not on file    Years of education: Not on file    Highest education level: Not on file   Occupational History    Occupation: retired   Social Needs    Financial resource strain: Not on file    Food insecurity     Worry: Not on file     Inability: Not on file   [employer] needs     Medical: Not on file     Non-medical: Not on file   Tobacco Use    Smoking status: Never Smoker    Smokeless tobacco: Never Used   Substance and Sexual Activity    Alcohol use:  Yes     Alcohol/week: 0.0 - 1.0 standard drinks     Comment: seldom    Drug use: No    Sexual activity: Not Currently     Partners: Female   Lifestyle    Physical activity     Days per week: Not on file     Minutes per session: Not on file    Stress: Not on file   Relationships    Social connections     Talks on phone: Not on file     Gets together: Not on file     Attends [employer] service: Not on file     Active member of club or organization: Not on file     Attends meetings of clubs or organizations: Not on file     Relationship status: Not on file    Intimate partner violence     Fear of current or ex partner: Not on file     Emotionally abused: Not on file     Physically abused: Not on file     Forced sexual activity: Not on file   Other Topics Concern    Not on file   Social History Narrative    Not on file     Social History     Tobacco Use   Smoking Status Never Smoker   Smokeless Tobacco Never Used        Temp (24hrs), Av.6 °F (36.4 °C), Min:97 °F (36.1 °C), Max:98.3 °F (36.8 °C)    Visit Vitals  /72   Pulse 61   Temp 97.7 °F (36.5 °C)   Resp 20   Wt 89.1 kg (196 lb 8 oz)   SpO2 97%   BMI 27.41 kg/m²       ROS: 12 point ROS obtained in details. Pertinent positives as mentioned in HPI,   otherwise negative    Physical Exam:    General: Well developed, well nourished male sitting on the  bed AAOx3 in no acute distress. General:   awake alert and oriented   HEENT:  Normocephalic, atraumatic, PERRL, EOMI, no scleral icterus or pallor; no conjunctival hemmohage;  nasal and oral mucous are moist and without evidence of lesions. No thrush. Dentition good. Neck supple, no bruits. Lymph Nodes:   no cervical, axillary or inguinal adenopathy   Lungs:   non-labored, bilaterally clear to auscultation- no crackles wheezes rales or rhonchi   Heart:  RRR, s1 and s2; no rubs or gallops, no edema, + pedal pulses   Abdomen:  soft, non-distended, active bowel sounds, no hepatomegaly, no splenomegaly.  Non tender   Genitourinary:  deferred   Extremities:   no clubbing, cyanosis; no joint effusions or swelling; Full ROM of all large joints to the upper and lower extremities; muscle mass appropriate for age   Neurologic:  No gross focal sensory abnormalities; 5/5 muscle strength to upper and lower extremities. Speech appropriate.  Cranial nerves intact                        Skin:  No rash or ulcers noted   Back:  no spinal or paraspinal muscle tenderness or rigidity, no CVA tenderness     Psychiatric:  No suicidal or homicidal ideations, appropriate mood and affect         Labs: Results:   Chemistry Recent Labs     06/21/20  0523 06/20/20  0348   GLU 90 97    139   K 4.5 3.8    107   CO2 23 27   BUN 11 12   CREA 0.56* 0.62   CA 9.2 9.2   AGAP 5 5   BUCR 20 19      CBC w/Diff Recent Labs     06/22/20  0508 06/21/20  0523 06/20/20  1945 06/20/20  0348   WBC  --  6.4  --  6.1   RBC  --  3.84*  --  3.70*   HGB 10.9* 10.7* 11.2* 10.4*   HCT 34.0* 33.2* 33.5* 32.1*   PLT  --  385  --  370   GRANS  --  58  --   --    LYMPH  --  28  --   --    EOS  --  3  --   --       Microbiology Recent Labs     06/19/20  1750   CULT GRAM NEGATIVE RODS*          RADIOLOGY:    All available imaging studies/reports in [facility] for this admission were reviewed    Dr. Yoselin Tomlin, Infectious Disease Specialist  [phone]  June 22, 2020  1:50 PM

## 2020-06-22 NOTE — PROGRESS NOTES
SUBJECTIVE:    Patient is sitting up in chair without any issues. Denies any headaches or dizziness. No chest pain or abdominal pain. No nausea or vomiting. No SOB or cough. Patient prefers going home later today after the procedure if possible. OBJECTIVE:    /74   Pulse 64   Temp 97.7 °F (36.5 °C)   Resp 18   Wt 89.1 kg (196 lb 8 oz)   SpO2 99%   BMI 27.41 kg/m²     General appearance - alert, well appearing, and in no distress  Chest -clear air entry noted in bases, no wheezes  Heart - S1 and S2 normal  Abdomen - soft, nontender, nondistended, Bowel sounds present, suprapubic catheter in situ  Neurological - alert, oriented, normal speech, no focal findings noted  Extremities - no pedal edema noted    ASSESSMENT:    1.  Bilateral hydroureteronephrosis  2. Urinary retention due to urinary stricture status post suprapubic catheter  3. History of prostate cancer  4. History of colon cancer status post resections and pelvic radiation  5. Gastric ulcer with recent GI bleed  6. Dark stool, improving. Stable H&H currently. 7.  Coronary artery disease  8. Hypertension  9. Dyslipidemia  10. Constipation, improved  11. UTI    PLAN:    Continue current management  Urology input noted about urological procedure later today  Urine culture from June 19, 2020 still pending at the time of this dictation  I have placed ID consult as patient had urine culture showing stenotrophomonas maltophilia on Nancy 10, 2020 but patient is allergic to Bactrim. Discharge home tomorrow if he is okay with urology otherwise patient can be discharged by urology service later today. Disclaimer: Sections of this note are dictated using utilizing voice recognition software, which may have resulted in some phonetic based errors in grammar and contents. Even though attempts were made to correct all the mistakes, some may have been missed, and remained in the body of the document.  If questions arise, please contact our department.     Recent Results (from the past 24 hour(s))   HGB & HCT    Collection Time: 06/22/20  5:08 AM   Result Value Ref Range    HGB 10.9 (L) 13.0 - 16.0 g/dL    HCT 34.0 (L) 36.0 - 48.0 %

## 2020-06-22 NOTE — PROGRESS NOTES
Bedside and Verbal shift change report given to Maria A Spencer [address] (oncoming nurse) by Elissa Nayak RN (offgoing nurse). Report included the following information SBAR, Kardex, Intake/Output, MAR and Recent Results.

## 2020-06-22 NOTE — PROGRESS NOTES
Progress Note      Patient: Dereje Maharaj MRN: [id number]  SSN: [id number]    YOB: 1945  Age: 66 y.o. Sex: male      Admit Date: 6/19/2020    LOS: 3 days     Assessment:   #1 Bilateral hydroureteronephrosis with no clear etiology s/p first right stent, then left in setting of sterile hydro initially, second event with sepsis  #2 Dense BNC, inaccessible by dilation, with long term SP tube   #3 T1c [place] 4+5 CaP s/p cryoablation 4/2013, PSA 2/2020 <0.03   #4 Colon cancer s/p Resection and pelvic radiation   #5 Chronic Constipation     On Abx per ID x 72 hours now  To OR for Cysto, retrogrades, possible BNC dilation, bilateral diagnostic ureteroscopy, possible biopsies, possible TURBT, bilateral stent exchange, SP tube exchange     Signed By: Marcin Mantilla MD     June 22, 2020      PAGER:  [phone]  OFFICE:  [address]:     No change, doing well     Objective:     Vitals:    06/22/20 0423 06/22/20 0812 06/22/20 1159 06/22/20 1613   BP: 151/74 151/74 157/72 162/72   Pulse: 62 64 61 68   Resp: 18 18 20 18   Temp: 97.3 °F (36.3 °C) 97.7 °F (36.5 °C) 97.7 °F (36.5 °C) 97.4 °F (36.3 °C)   SpO2: 98% 99% 97% 96%   Weight:            Intake and Output:  Current Shift: No intake/output data recorded. Last three shifts: 06/20 1901 - 06/22 0700  In: 3958 [P.O.:1201;  I.V.:50]  Out: 3300 [Urine:3300]    Current Facility-Administered Medications   Medication Dose Route Frequency    cefTAZidime (FORTAZ) 2 g in 0.9% sodium chloride 50 mL IVPB  2 g IntraVENous ON CALL TO OR    vancomycin (VANCOCIN) 1,000 mg in 0.9% sodium chloride (MBP/ADV) 250 mL adv  1,000 mg IntraVENous ON CALL TO OR    cefTAZidime (FORTAZ) 2 g in 0.9% sodium chloride 50 mL IVPB  2 g IntraVENous Q8H    ciprofloxacin (CIPRO) 400 mg in D5W IVPB (premix)  400 mg IntraVENous Q12H    ciprofloxacin (CIPRO) 400 mg in D5W IVPB (premix)  400 mg IntraVENous ON CALL TO OR    dextrose 5% and 0.9% NaCl infusion  75 mL/hr IntraVENous CONTINUOUS    amLODIPine (NORVASC) tablet 10 mg  10 mg Oral DAILY    pantoprazole (PROTONIX) tablet 40 mg  40 mg Oral ACB&D    senna-docusate (PERICOLACE) 8.6-50 mg per tablet 1 Tab  1 Tab Oral DAILY    polyethylene glycol (MIRALAX) packet 17 g  17 g Oral DAILY PRN    ondansetron (ZOFRAN) injection 4 mg  4 mg IntraVENous Q4H PRN    cyanocobalamin (VITAMIN B12) tablet 500 mcg  500 mcg Oral DAILY    ergocalciferol capsule 50,000 Units  50,000 Units Oral Q7D    metoprolol tartrate (LOPRESSOR) tablet 50 mg  50 mg Oral QHS    acetaminophen (TYLENOL) tablet 650 mg  650 mg Oral Q4H PRN    atorvastatin (LIPITOR) tablet 40 mg  40 mg Oral DAILY    losartan (COZAAR) tablet 50 mg  50 mg Oral BID    hydrALAZINE (APRESOLINE) 20 mg/mL injection 10 mg  10 mg IntraVENous Q6H PRN         Physical Exam:   GENERAL: alert, cooperative, no distress, appears stated age  LUNG: unlabored breathing  ABDOMEN: soft, non-tender. No masses,  no organomegaly  EXTREMITIES:  extremities normal, atraumatic, no cyanosis or edema  SKIN: no jaundice  : no CVA tenderness    Lab/Data Review:  BMP: No results found for: NA, K, CL, CO2, AGAP, GLU, BUN, CREA, GFRAA, GFRNA  CBC:   Lab Results   Component Value Date/Time    HGB 10.9 (L) 2020 05:08 AM    HCT 34.0 (L) 2020 05:08 AM     COAGS: No results found for: APTT, PTP, INR, INREXT, INREXT       CT Results:  Results from Abstract encounter on 20   CT UROGRAM W WO CONT    Impression [facility] COGNITIVE DISORDERS Cat Scan  Teresa [address] [phone]     Imaging Result     Name:    Monica Gabriel  ([id number])    Sex: Male     :  1945     Ordering Provider: Durga REYES Provider:       Diagnosis:    Hydronephrosis [N13.30 (ICD-10-CM)]    None Specified       Procedures Performed:    CT UROGRAM    [id number]     Exam Date/Time:    2020  2:07 PM           EXAM: CT UROGRAM         CLINICAL INDICATION/HISTORY:  N13.30: Hydronephrosis      > Additional: Right lower quadrant pain for 2 weeks. Remote history of renal calculus disease. Prostate carcinoma. COMPARISON: CT urography 1/9/2015         TECHNIQUE: Helical CT imaging of the abdomen and pelvis was performed with and without intravenous contrast. Multiphase imaging of the kidneys, renal collecting systems, and bladder was performed. Multiplanar reformats were generated. One or more dose reduction techniques were used on this CT: automated exposure control, adjustment of the mAs and/or kVp according to patient size, and iterative reconstruction techniques. The specific techniques used on this CT exam have been documented in the patient's electronic medical record. Digital Imaging and Communications in Medicine (DICOM) format image data are available to nonaffiliated external healthcare facilities or entities on a secure, media free, reciprocally searchable basis with patient authorization for at least a 12-month period after this study. _______________         FINDINGS:    LOWER CHEST: Calcified pleural plaques. Subpleural groundglass opacities dependent lower lobes compatible with fibrosis. CABG. KIDNEYS, URETER, BLADDER:       > Urolithiasis: Contiguous 2 mm calculi upper pole left kidney. > Parenchyma: No solid mass. Normal symmetric enhancement. Multiple bilateral renal cysts primarily simple. One upper pole right renal cyst contains a thin septation with calcification. This cyst measures about 5.2 cm in size. Largest left renal cyst interpolar region measures 2.5 cm.      > Upper urinary tracts: There is right hydronephrosis and ureterectasis to the level of distal right ureter. No evidence of filling defect within the opacified portions of renal collecting systems and ureters.       > Renal vasculature: Renal arteries and veins are patent.      > Bladder: Bladder decompressed with suprapubic catheter and not well evaluated. LIVER, BILIARY: Liver is normal. No biliary dilation. Cholelithiasis. PANCREAS: Normal.    SPLEEN: Normal.    ADRENALS: Normal.    LYMPH NODES: No enlarged lymph nodes. GASTROINTESTINAL TRACT: No bowel dilatation. Concentric mural thickening of rectum probably post therapy findings without change. VASCULATURE: Severe aortoiliac atherosclerosis. Atherosclerotic plaque at origin origin right renal artery, celiac and superior mesenteric arteries. PELVIC ORGANS: Dense dystrophic appearing calcifications extraperitoneal lower pelvis along pelvic side walls and presacral space with associated noncalcified soft tissue plaque-like thickening presacral space extending to posterior serosal margin of rectum. BONES: No acute or aggressive osseous abnormalities identified. Multilevel thoracic and lumbar spondylosis. Sclerotic focus right sacrum likely benign bone island. Cortical sclerosis posterior iliac bones likely chronic radiation osteopathy. OTHER: Penile pump device. _______________         IMPRESSION  1. Small nonobstructing left upper pole renal calculi. Multiple bilateral renal cysts all of which are simple except for one right upper pole cyst which contains a thin septation. 2. Right hydroureteronephrosis very likely due to encasement of distal right ureter within the area of plaque-like soft tissue thickening along right lower pelvic sidewall. 3. Bladder decompressed with suprapubic catheter. 4. Chronic concentric mural thickening of rectum very likely post radiation effect. 5. Dense plaque-like soft tissue and dystrophic calcifications inferior lateral pelvic sidewall and presacral space compatible with post treatment effect without change. 6. Cholelithiasis. 7. 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          Signed By: Jaye Ayon MD on 4/4/2020 9:50 AM                Dictated by: Chasity Dhillon on Sat Apr 4, 2020  9:35:56 AM EDT      Signed Jaxson Arellano MD   4/4/2020  9:50 AM              [facility] [[id number]]       ~~This report was copied and pasted from the servicing EHR system. ~~         US Results:  No results found for this or any previous visit.                               Principal Problem:    Bilateral hydronephrosis (6/19/2020)

## 2020-06-23 VITALS
BODY MASS INDEX: 27.41 KG/M2 | WEIGHT: 196.5 LBS | HEART RATE: 70 BPM | TEMPERATURE: 96.8 F | DIASTOLIC BLOOD PRESSURE: 64 MMHG | RESPIRATION RATE: 16 BRPM | OXYGEN SATURATION: 99 % | SYSTOLIC BLOOD PRESSURE: 127 MMHG

## 2020-06-23 LAB
ANION GAP SERPL CALC-SCNC: 5 MMOL/L (ref 3–18)
BASOPHILS # BLD: 0 K/UL (ref 0–0.1)
BASOPHILS NFR BLD: 0 % (ref 0–2)
BUN SERPL-MCNC: 10 MG/DL (ref 7–18)
BUN/CREAT SERPL: 14 (ref 12–20)
CALCIUM SERPL-MCNC: 8.8 MG/DL (ref 8.5–10.1)
CHLORIDE SERPL-SCNC: 105 MMOL/L (ref 100–111)
CO2 SERPL-SCNC: 26 MMOL/L (ref 21–32)
CREAT SERPL-MCNC: 0.69 MG/DL (ref 0.6–1.3)
DIFFERENTIAL METHOD BLD: ABNORMAL
EOSINOPHIL # BLD: 0 K/UL (ref 0–0.4)
EOSINOPHIL NFR BLD: 0 % (ref 0–5)
ERYTHROCYTE [DISTWIDTH] IN BLOOD BY AUTOMATED COUNT: 13.4 % (ref 11.6–14.5)
GLUCOSE SERPL-MCNC: 130 MG/DL (ref 74–99)
HCT VFR BLD AUTO: 30.8 % (ref 36–48)
HGB BLD-MCNC: 10.2 G/DL (ref 13–16)
LYMPHOCYTES # BLD: 0.9 K/UL (ref 0.9–3.6)
LYMPHOCYTES NFR BLD: 9 % (ref 21–52)
MCH RBC QN AUTO: 28.4 PG (ref 24–34)
MCHC RBC AUTO-ENTMCNC: 33.1 G/DL (ref 31–37)
MCV RBC AUTO: 85.8 FL (ref 74–97)
MONOCYTES # BLD: 0.8 K/UL (ref 0.05–1.2)
MONOCYTES NFR BLD: 8 % (ref 3–10)
NEUTS SEG # BLD: 8.2 K/UL (ref 1.8–8)
NEUTS SEG NFR BLD: 83 % (ref 40–73)
PLATELET # BLD AUTO: 361 K/UL (ref 135–420)
PMV BLD AUTO: 9.6 FL (ref 9.2–11.8)
POTASSIUM SERPL-SCNC: 4.6 MMOL/L (ref 3.5–5.5)
RBC # BLD AUTO: 3.59 M/UL (ref 4.7–5.5)
SODIUM SERPL-SCNC: 136 MMOL/L (ref 136–145)
WBC # BLD AUTO: 9.9 K/UL (ref 4.6–13.2)

## 2020-06-23 PROCEDURE — 74011250636 HC RX REV CODE- 250/636: Performed by: UROLOGY

## 2020-06-23 PROCEDURE — 80048 BASIC METABOLIC PNL TOTAL CA: CPT

## 2020-06-23 PROCEDURE — 74011250636 HC RX REV CODE- 250/636: Performed by: INTERNAL MEDICINE

## 2020-06-23 PROCEDURE — 85025 COMPLETE CBC W/AUTO DIFF WBC: CPT

## 2020-06-23 PROCEDURE — 36415 COLL VENOUS BLD VENIPUNCTURE: CPT

## 2020-06-23 PROCEDURE — 74011000258 HC RX REV CODE- 258: Performed by: UROLOGY

## 2020-06-23 PROCEDURE — 74011250637 HC RX REV CODE- 250/637: Performed by: FAMILY MEDICINE

## 2020-06-23 PROCEDURE — 74011250637 HC RX REV CODE- 250/637: Performed by: UROLOGY

## 2020-06-23 RX ORDER — LANOLIN ALCOHOL/MO/W.PET/CERES
500 CREAM (GRAM) TOPICAL DAILY
Status: DISCONTINUED | OUTPATIENT
Start: 2020-06-23 | End: 2020-06-23 | Stop reason: HOSPADM

## 2020-06-23 RX ORDER — PANTOPRAZOLE SODIUM 40 MG/1
40 TABLET, DELAYED RELEASE ORAL
Status: DISCONTINUED | OUTPATIENT
Start: 2020-06-23 | End: 2020-06-23 | Stop reason: HOSPADM

## 2020-06-23 RX ORDER — POLYETHYLENE GLYCOL 3350 17 G/17G
17 POWDER, FOR SOLUTION ORAL
Status: DISCONTINUED | OUTPATIENT
Start: 2020-06-23 | End: 2020-06-23 | Stop reason: HOSPADM

## 2020-06-23 RX ORDER — AMOXICILLIN 250 MG
1 CAPSULE ORAL DAILY
Qty: 10 TAB | Refills: 0 | Status: SHIPPED | OUTPATIENT
Start: 2020-06-24 | End: 2022-07-12

## 2020-06-23 RX ORDER — ATORVASTATIN CALCIUM 40 MG/1
40 TABLET, FILM COATED ORAL DAILY
Status: DISCONTINUED | OUTPATIENT
Start: 2020-06-23 | End: 2020-06-23 | Stop reason: HOSPADM

## 2020-06-23 RX ORDER — ERGOCALCIFEROL 1.25 MG/1
50000 CAPSULE ORAL
Status: DISCONTINUED | OUTPATIENT
Start: 2020-06-27 | End: 2020-06-23 | Stop reason: HOSPADM

## 2020-06-23 RX ORDER — CIPROFLOXACIN 2 MG/ML
400 INJECTION, SOLUTION INTRAVENOUS EVERY 12 HOURS
Status: DISCONTINUED | OUTPATIENT
Start: 2020-06-23 | End: 2020-06-23

## 2020-06-23 RX ORDER — ACETAMINOPHEN 325 MG/1
650 TABLET ORAL
Status: DISCONTINUED | OUTPATIENT
Start: 2020-06-23 | End: 2020-06-23 | Stop reason: HOSPADM

## 2020-06-23 RX ORDER — HYDRALAZINE HYDROCHLORIDE 20 MG/ML
10 INJECTION INTRAMUSCULAR; INTRAVENOUS
Status: DISCONTINUED | OUTPATIENT
Start: 2020-06-23 | End: 2020-06-23 | Stop reason: HOSPADM

## 2020-06-23 RX ORDER — ONDANSETRON 2 MG/ML
4 INJECTION INTRAMUSCULAR; INTRAVENOUS
Status: DISCONTINUED | OUTPATIENT
Start: 2020-06-23 | End: 2020-06-23 | Stop reason: HOSPADM

## 2020-06-23 RX ORDER — METOPROLOL TARTRATE 50 MG/1
50 TABLET ORAL
Status: DISCONTINUED | OUTPATIENT
Start: 2020-06-23 | End: 2020-06-23 | Stop reason: HOSPADM

## 2020-06-23 RX ORDER — LOSARTAN POTASSIUM 50 MG/1
50 TABLET ORAL 2 TIMES DAILY
Status: DISCONTINUED | OUTPATIENT
Start: 2020-06-23 | End: 2020-06-23 | Stop reason: HOSPADM

## 2020-06-23 RX ORDER — AMLODIPINE BESYLATE 10 MG/1
10 TABLET ORAL DAILY
Status: DISCONTINUED | OUTPATIENT
Start: 2020-06-23 | End: 2020-06-23 | Stop reason: HOSPADM

## 2020-06-23 RX ORDER — DEXTROSE MONOHYDRATE AND SODIUM CHLORIDE 5; .9 G/100ML; G/100ML
75 INJECTION, SOLUTION INTRAVENOUS CONTINUOUS
Status: DISCONTINUED | OUTPATIENT
Start: 2020-06-23 | End: 2020-06-23

## 2020-06-23 RX ORDER — AMOXICILLIN 250 MG
1 CAPSULE ORAL DAILY
Status: DISCONTINUED | OUTPATIENT
Start: 2020-06-23 | End: 2020-06-23 | Stop reason: HOSPADM

## 2020-06-23 RX ADMIN — ATORVASTATIN CALCIUM 40 MG: 40 TABLET, FILM COATED ORAL at 09:51

## 2020-06-23 RX ADMIN — AMLODIPINE BESYLATE 10 MG: 10 TABLET ORAL at 09:51

## 2020-06-23 RX ADMIN — ACETAMINOPHEN 650 MG: 325 TABLET ORAL at 03:05

## 2020-06-23 RX ADMIN — METOPROLOL TARTRATE 50 MG: 50 TABLET, FILM COATED ORAL at 00:21

## 2020-06-23 RX ADMIN — DOCUSATE SODIUM 50MG AND SENNOSIDES 8.6MG 1 TABLET: 8.6; 5 TABLET, FILM COATED ORAL at 09:50

## 2020-06-23 RX ADMIN — CEFTAZIDIME 2 G: 2 INJECTION, POWDER, FOR SOLUTION INTRAVENOUS at 07:35

## 2020-06-23 RX ADMIN — CIPROFLOXACIN 400 MG: 2 INJECTION, SOLUTION INTRAVENOUS at 03:08

## 2020-06-23 RX ADMIN — CYANOCOBALAMIN TAB 500 MCG 500 MCG: 500 TAB at 09:51

## 2020-06-23 RX ADMIN — LOSARTAN POTASSIUM 50 MG: 50 TABLET, FILM COATED ORAL at 09:51

## 2020-06-23 RX ADMIN — PANTOPRAZOLE SODIUM 40 MG: 40 TABLET, DELAYED RELEASE ORAL at 09:50

## 2020-06-23 NOTE — DISCHARGE SUMMARY
Physician Discharge Summary       Patient: Fransico Dotson MRN: [id number]  SSN: [id number]    YOB: 1945  Age: 66 y.o. Sex: male    PCP: Karla Reina MD    Allergies: Penicillins and Bactrim [sulfamethoxazole-trimethoprim]    Admit date: 6/19/2020  Admitting Provider: Altaf Harris MD    Discharge date: 6/23/2020  Discharging Provider: Tenisha Rothman MD    * Admission Diagnoses: Bilateral hydronephrosis [N13.30]  Hydronephrosis [N13.30]    * Discharge Diagnoses:      1. Bilateral hydroureteronephrosis s/p  Cysto, retrogrades, BNC dilation, bilateral diagnostic ureteroscopy, bladder biopsies, bilateral stent exchange, and SP tube exchange 6/22/2020  2. Urinary retention due to urinary stricture status post suprapubic catheter  3. History of prostate cancer  4. History of colon cancer status post resections and pelvic radiation  5. Gastric ulcer with recent GI bleed  6. Dark stool, improving. Stable H&H currently. 7.  Coronary artery disease  8. Hypertension  9. Dyslipidemia  10. Constipation, improved  11. Chronic cystitis with multidrug resistant organisms, requiring admission for IV abx in setting of no other antibiotic options     * Hospital Course: Patient was admitted to the hospital on June 19, 2020 with a complaint of foul-smelling urine. Please refer hospital admission H&P for further detail. Patient was admitted with a diagnosis of UTI. Patient was treated with IV antibiotic. Patient had a urine culture done that showed showed 40,000 gram-negative rods. Patient was seen by ID physician and did not think we need any further antibiotic as patient has received 3 days antibiotic and has no symptoms currently. Patient has history of chronic urinary stricture and suprapubic catheter that will be continued.   Patient was also found out to have bilateral hydroureteronephrosis and underwent cystoscopy, bilateral stent exchange and suprapubic tube exchange on June 22, 2020 by urology service. Patient initially had some dark stools with a history of recent gastric ulcer and colon cancer requiring pelvic radiation in the past.  However his H&H remained stable. He did not have any worsening melena. He was continued on his home medication for the comorbidities. He had constipation that was treated appropriately. He remained afebrile. He will be discharged home on the following medications today. * Procedures:   Procedure(s):  CYSTOSCOPY /RANDOM BLADDER BIOPSIES  BILATERAL URETEROSCOPY//BILATERAL STENT EXCHANGE/C-ARM  SUPRAPUBIC TUBE EXCHANGE      Consults: ID and Urology    Significant Diagnostic Studies: None    Discharge Exam:  Please refer to my progress note from June 22, 2020 for additional information    * Discharge Condition: improved  * Disposition: Home    Discharge Medications:  Current Discharge Medication List      START taking these medications    Details   senna-docusate (PERICOLACE) 8.6-50 mg per tablet Take 1 Tab by mouth daily. Indications: hold it if diarrhea  Qty: 10 Tab, Refills: 0         CONTINUE these medications which have NOT CHANGED    Details   amLODIPine (NORVASC) 2.5 mg tablet take 1 tablet by mouth once daily      losartan (COZAAR) 50 mg tablet take 1 tablet by mouth twice a day      omeprazole (PRILOSEC) 20 mg capsule take 1 capsule by mouth twice a day for 30 DAYS THEN TAKE 1 once daily THEREAFTER      sodium chloride irrigation (Sterile Saline) 0.9 % irrigation Use PRN for irrigation flushes. Qty: 2000 mL, Refills: 12      Urinary Bag (BARD DISPOZ-A-BAG-FLIP-ARGENIS VLV) misc 10 Bags by Does Not Apply route as needed for Other. Qty: 3 Each, Refills: 3      metoprolol (LOPRESSOR) 100 mg tablet 50 mg at bedtime as directed for dose pack. cyanocobalamin (VITAMIN B-12) 500 mcg tablet Take 500 mcg by mouth daily. atorvastatin (LIPITOR) 40 mg tablet Take 40 mg by mouth daily.       ergocalciferol (VITAMIN D2) 50,000 unit capsule Take 50,000 Units by mouth every seven (7) days. aspirin 81 mg tablet Take 81 mg by mouth at bedtime as directed for dose pack. * Follow-up Care/Patient Instructions: Activity: Activity as tolerated  Diet: Cardiac Diet and Diabetic Diet  Wound Care: None needed    Follow-up Information     Follow up With Specialties Details Why Nan Peterson MD Urology Schedule an appointment as soon as possible for a visit in 4 weeks SPT change [address] [facility] [address] [facility]      Dax Case MD Urology Schedule an appointment as soon as possible for a visit in 8 weeks f/up with Dr. Rock Swann pre-op for [address] stent exchange. SPT change at this visit [address] [facility] [address] [facility]      Nehemiah Dhaliwal MD Internal Medicine   [address] [place] [id number] [phone]          Follow-up Appointments   Procedures    FOLLOW UP VISIT Appointment in: Other (Brianna Starr) With PCP in 5 days With dr. Michael Diggs in one week     With PCP in 5 days  With dr. Michael Diggs in one week     Standing Status:   Standing     Number of Occurrences:   1     Order Specific Question:   Appointment in     Answer: Other (Specify)     Total time of discharge greater than 35 minutes    Disclaimer: Sections of this note are dictated using utilizing voice recognition software, which may have resulted in some phonetic based errors in grammar and contents. Even though attempts were made to correct all the mistakes, some may have been missed, and remained in the body of the document. If questions arise, please contact our department.       Signed:  Júnior Ogden MD  6/23/2020  10:57 AM

## 2020-06-23 NOTE — PROGRESS NOTES
Infectious Disease progress Note      Reason: pseudomonas, stenotrophomonas UTI, need for urological procedure; pcn/sulfa allergy    Current abx Prior abx   Ceftazidime since 6/19  Ciprofloxacin since 6/22  Vancomycin x 1 on 6/22      Lines:       Assessment :    66 y.o. male with PMHx of bilateral hydroureteronephrosis, urinary retention, urinary stricture, prostate CA, colon CA s/p resection and pelvic radiation, CAD, MI, HTN, HLD, gastric ulcer and chronic constipation who is a direct admit to the hospital for bilateral hydroureternephrosis of unclear etiology admitted to [facility] on 6/19/20 for urological procedure     Urine cultures 4/22-stenotrophomonas intermediately susceptible to ceftazidime, resistant to levofloxacin  Urine culture 6/10-stenotrophomonas, Pseudomonas  Urine culture 6/19-40,000 colonies of gram-negative rods    Clinical presentation consistent with partially treated stenotrophomonas, Pseudomonas cystitis. Status post cystoscopy, bilateral ureteroscopy, bilateral JJ ureteral stent exchange, bladder biopsy and fulguration on 6/22/2020. Intraoperative findings discussed with Dr. Andrea Cruz. No purulent urine noted intraoperatively. Antibiotic management complicated by documented history of penicillin, Bactrim allergy. Discussed with the patient he had hives with penicillin. Currently tolerating ceftazidime without difficulties. He does not recollect nature of Bactrim allergy. However does not think he had life-threatening allergy to Bactrim. Recommendations:    1. Discontinue ceftazidime, ciprofloxacin  2.  DC planning per primary team    Above plan was discussed in details with patient, and dr Andrea Cruz. Please call me if any further questions or concerns. Will continue to participate in the care of this patient. HPI:    Currently patient states that he feels fine. He denies any flank pain. Patient denies headaches, visual disturbances, sore throat, runny nose, earaches, cp, sob, chills, cough, abdominal pain, diarrhea, burning micturition, pain or weakness in extremities. He denies back pain/flank pain. He denies recent sick contacts. No h/o recent travel. No known h/o MRSA colonization or infection in the past.        home Medication List       Details   amLODIPine (NORVASC) 2.5 mg tablet take 1 tablet by mouth once daily      losartan (COZAAR) 50 mg tablet take 1 tablet by mouth twice a day      omeprazole (PRILOSEC) 20 mg capsule take 1 capsule by mouth twice a day for 30 DAYS THEN TAKE 1 once daily THEREAFTER      sodium chloride irrigation (Sterile Saline) 0.9 % irrigation Use PRN for irrigation flushes. Qty: 2000 mL, Refills: 12      Urinary Bag (BARD DISPOZ-A-BAG-FLIP-ARGENIS VLV) misc 10 Bags by Does Not Apply route as needed for Other. Qty: 3 Each, Refills: 3      metoprolol (LOPRESSOR) 100 mg tablet 50 mg at bedtime as directed for dose pack. cyanocobalamin (VITAMIN B-12) 500 mcg tablet Take 500 mcg by mouth daily. atorvastatin (LIPITOR) 40 mg tablet Take 40 mg by mouth daily. ergocalciferol (VITAMIN D2) 50,000 unit capsule Take 50,000 Units by mouth every seven (7) days. aspirin 81 mg tablet Take 81 mg by mouth at bedtime as directed for dose pack.              Current Facility-Administered Medications   Medication Dose Route Frequency    acetaminophen (TYLENOL) tablet 650 mg  650 mg Oral Q4H PRN    amLODIPine (NORVASC) tablet 10 mg  10 mg Oral DAILY    atorvastatin (LIPITOR) tablet 40 mg  40 mg Oral DAILY    cefTAZidime (FORTAZ) 2 g in 0.9% sodium chloride (MBP/ADV) 100 mL MBP  2 g IntraVENous Q8H    ciprofloxacin (CIPRO) 400 mg in D5W IVPB (premix)  400 mg IntraVENous Q12H    cyanocobalamin (VITAMIN B12) tablet 500 mcg  500 mcg Oral DAILY    dextrose 5% and 0.9% NaCl infusion  75 mL/hr IntraVENous CONTINUOUS    [START ON 6/27/2020] ergocalciferol capsule 50,000 Units  50,000 Units Oral every Saturday    hydrALAZINE (APRESOLINE) 20 mg/mL injection 10 mg  10 mg IntraVENous Q6H PRN    losartan (COZAAR) tablet 50 mg  50 mg Oral BID    metoprolol tartrate (LOPRESSOR) tablet 50 mg  50 mg Oral QHS    ondansetron (ZOFRAN) injection 4 mg  4 mg IntraVENous Q4H PRN    pantoprazole (PROTONIX) tablet 40 mg  40 mg Oral ACB&D    polyethylene glycol (MIRALAX) packet 17 g  17 g Oral DAILY PRN    senna-docusate (PERICOLACE) 8.6-50 mg per tablet 1 Tab  1 Tab Oral DAILY       Allergies: Penicillins and Bactrim [sulfamethoxazole-trimethoprim]    Temp (24hrs), Av.2 °F (36.2 °C), Min:96.5 °F (35.8 °C), Max:98.2 °F (36.8 °C)    Visit Vitals  /64 (BP 1 Location: Left arm, BP Patient Position: At rest)   Pulse 70   Temp 96.8 °F (36 °C)   Resp 16   Wt 89.1 kg (196 lb 8 oz)   SpO2 99%   BMI 27.41 kg/m²       ROS: 12 point ROS obtained in details. Pertinent positives as mentioned in HPI,   otherwise negative    Physical Exam:    General: Well developed, well nourished male sitting on the  bed AAOx3 in no acute distress. General:   awake alert and oriented   HEENT:  Normocephalic, atraumatic, PERRL, EOMI, no scleral icterus or pallor; no conjunctival hemmohage;  nasal and oral mucous are moist and without evidence of lesions. No thrush. Dentition good. Neck supple, no bruits. Lymph Nodes:   no cervical, axillary or inguinal adenopathy   Lungs:   non-labored, bilaterally clear to auscultation- no crackles wheezes rales or rhonchi   Heart:  RRR, s1 and s2; no rubs or gallops, no edema, + pedal pulses   Abdomen:  soft, non-distended, active bowel sounds, no hepatomegaly, no splenomegaly. Non tender   Genitourinary:  deferred   Extremities:   no clubbing, cyanosis; no joint effusions or swelling; Full ROM of all large joints to the upper and lower extremities; muscle mass appropriate for age   Neurologic:  No gross focal sensory abnormalities; 5/5 muscle strength to upper and lower extremities. Speech appropriate.  Cranial nerves intact                        Skin:  No rash or ulcers noted   Back:  no spinal or paraspinal muscle tenderness or rigidity, no CVA tenderness     Psychiatric:  No suicidal or homicidal ideations, appropriate mood and affect         Labs: Results:   Chemistry Recent Labs     06/23/20  0250 06/21/20  0523   * 90    137   K 4.6 4.5    109   CO2 26 23   BUN 10 11   CREA 0.69 0.56*   CA 8.8 9.2   AGAP 5 5   BUCR 14 20      CBC w/Diff Recent Labs     06/23/20  0250 06/22/20  0508 06/21/20  0523   WBC 9.9  --  6.4   RBC 3.59*  --  3.84*   HGB 10.2* 10.9* 10.7*   HCT 30.8* 34.0* 33.2*     --  385   GRANS 83*  --  58   LYMPH 9*  --  28   EOS 0  --  3      Microbiology No results for input(s): CULT in the last 72 hours.        RADIOLOGY:    All available imaging studies/reports in [facility] for this admission were reviewed    Dr. Gee Gorman, Infectious Disease Specialist  [phone]  June 23, 2020  1:50 PM

## 2020-06-23 NOTE — PROGRESS NOTES
Progress Note      Patient: Kirsten Diego MRN: [id number]  SSN: [id number]    YOB: 1945  Age: 66 y.o. Sex: male      Admit Date: 6/19/2020    LOS: 4 days     Assessment:   #1 Bilateral hydroureteronephrosis 2/2 bilateral distal ureteral strictures   -s/p  Cysto, retrogrades, BNC dilation, bilateral diagnostic ureteroscopy, bladder biopsies, bilateral stent exchange, and SP tube exchange 6/22/2020  #2 Dense BNC, inaccessible by dilation, with long term SP tube - last changed 6/22/2020  #3 T1c Iliana 4+5 CaP s/p cryoablation 4/2013, PSA 2/2020 <0.03   #4 Colon cancer s/p Resection and pelvic radiation   #5 Chronic Constipation       Plan  Doing well POD 1, labs WNL, mild suprapubic discomfort, Vss and afebrile  Maintain SPT- will need exchanges every 4 weeks as outpt  Will need JJ stent exchanges every 3 months as outpt  No more abx needed per ID   Ok to discharge home from a urological standpoint  Follow up arranged: YES Message sent to office to arrange SPT change in 4 weeks and f/up with Dr. Diana Parsons in 8 weeks  Will sign off at this time. Please contact with any questions or concerns. Signed By: Russell Marin NP     June 23, 2020      [facility]  [facility]  Pager # [phone]    Available M-F 7:30- 5pm .  After 5pm and weekends please call answering service at [phone]          Subjective:     Hurts in my bladder, took tylenol that should help    Objective:     Vitals:    06/22/20 2029 06/23/20 0018 06/23/20 0416 06/23/20 0916   BP: 148/74 135/70 132/73 127/64   Pulse: 87 (!) 102 64 70   Resp: 18 18 16 16   Temp: (!) 96.5 °F (35.8 °C) 97.8 °F (36.6 °C) 98.2 °F (36.8 °C) 96.8 °F (36 °C)   SpO2: 94% 96% 98% 99%   Weight:            Intake and Output:  Current Shift: No intake/output data recorded. Last three shifts: 06/21 1901 - 06/23 0700  In: 1550 [P.O.:350;  I.V.:1200]  Out: 2600 [Urine:2600]    Current Facility-Administered Medications Medication Dose Route Frequency    acetaminophen (TYLENOL) tablet 650 mg  650 mg Oral Q4H PRN    amLODIPine (NORVASC) tablet 10 mg  10 mg Oral DAILY    atorvastatin (LIPITOR) tablet 40 mg  40 mg Oral DAILY    cyanocobalamin (VITAMIN B12) tablet 500 mcg  500 mcg Oral DAILY    dextrose 5% and 0.9% NaCl infusion  75 mL/hr IntraVENous CONTINUOUS    [START ON 6/27/2020] ergocalciferol capsule 50,000 Units  50,000 Units Oral every Saturday    hydrALAZINE (APRESOLINE) 20 mg/mL injection 10 mg  10 mg IntraVENous Q6H PRN    losartan (COZAAR) tablet 50 mg  50 mg Oral BID    metoprolol tartrate (LOPRESSOR) tablet 50 mg  50 mg Oral QHS    ondansetron (ZOFRAN) injection 4 mg  4 mg IntraVENous Q4H PRN    pantoprazole (PROTONIX) tablet 40 mg  40 mg Oral ACB&D    polyethylene glycol (MIRALAX) packet 17 g  17 g Oral DAILY PRN    senna-docusate (PERICOLACE) 8.6-50 mg per tablet 1 Tab  1 Tab Oral DAILY         Physical Exam:   GENERAL: alert, cooperative, no distress, appears stated age  LUNG: unlabored breathing  ABDOMEN: Mild suprapubic tenderness.  SPT draining light red tinged urine without clots  EXTREMITIES:  extremities normal, atraumatic, no cyanosis or edema  SKIN: no jaundice  : no CVA tenderness    Lab/Data Review:  BMP:   Lab Results   Component Value Date/Time     06/23/2020 02:50 AM    K 4.6 06/23/2020 02:50 AM     06/23/2020 02:50 AM    CO2 26 06/23/2020 02:50 AM    AGAP 5 06/23/2020 02:50 AM     (H) 06/23/2020 02:50 AM    BUN 10 06/23/2020 02:50 AM    CREA 0.69 06/23/2020 02:50 AM    GFRAA >60 06/23/2020 02:50 AM    GFRNA >60 06/23/2020 02:50 AM     CBC:   Lab Results   Component Value Date/Time    WBC 9.9 06/23/2020 02:50 AM    HGB 10.2 (L) 06/23/2020 02:50 AM    HCT 30.8 (L) 06/23/2020 02:50 AM     06/23/2020 02:50 AM     COAGS: No results found for: APTT, PTP, INR, INREXT, INREXT, INREXT       CT Results:  Results from Abstract encounter on 04/07/20   CT UROGRAM W WO CONT [address] [facility] Cat Scan  [address]  [phone]     Imaging Result     Name:    Vincent Palomo ([id number])    Sex: Male     :  1945     Ordering Provider: Foreign REYES Provider:       Diagnosis:    Hydronephrosis [N13.30 (ICD-10-CM)]    None Specified       Procedures Performed:    CT UROGRAM    [id number]     Exam Date/Time:    2020  2:07 PM           EXAM: CT UROGRAM         CLINICAL INDICATION/HISTORY:  N13.30: Hydronephrosis      > Additional: Right lower quadrant pain for 2 weeks. Remote history of renal calculus disease. Prostate carcinoma. COMPARISON: CT urography 2015         TECHNIQUE: Helical CT imaging of the abdomen and pelvis was performed with and without intravenous contrast. Multiphase imaging of the kidneys, renal collecting systems, and bladder was performed. Multiplanar reformats were generated. One or more dose reduction techniques were used on this CT: automated exposure control, adjustment of the mAs and/or kVp according to patient size, and iterative reconstruction techniques. The specific techniques used on this CT exam have been documented in the patient's electronic medical record. Digital Imaging and Communications in Medicine (DICOM) format image data are available to nonaffiliated external healthcare facilities or entities on a secure, media free, reciprocally searchable basis with patient authorization for at least a 12-month period after this study. _______________         FINDINGS:    LOWER CHEST: Calcified pleural plaques. Subpleural groundglass opacities dependent lower lobes compatible with fibrosis. CABG. KIDNEYS, URETER, BLADDER:       > Urolithiasis: Contiguous 2 mm calculi upper pole left kidney. > Parenchyma: No solid mass. Normal symmetric enhancement. Multiple bilateral renal cysts primarily simple.  One upper pole right renal cyst contains a thin septation with calcification. This cyst measures about 5.2 cm in size. Largest left renal cyst interpolar region measures 2.5 cm.      > Upper urinary tracts: There is right hydronephrosis and ureterectasis to the level of distal right ureter. No evidence of filling defect within the opacified portions of renal collecting systems and ureters. > Renal vasculature: Renal arteries and veins are patent.      > Bladder: Bladder decompressed with suprapubic catheter and not well evaluated. LIVER, BILIARY: Liver is normal. No biliary dilation. Cholelithiasis. PANCREAS: Normal.    SPLEEN: Normal.    ADRENALS: Normal.    LYMPH NODES: No enlarged lymph nodes. GASTROINTESTINAL TRACT: No bowel dilatation. Concentric mural thickening of rectum probably post therapy findings without change. VASCULATURE: Severe aortoiliac atherosclerosis. Atherosclerotic plaque at origin origin right renal artery, celiac and superior mesenteric arteries. PELVIC ORGANS: Dense dystrophic appearing calcifications extraperitoneal lower pelvis along pelvic side walls and presacral space with associated noncalcified soft tissue plaque-like thickening presacral space extending to posterior serosal margin of rectum. BONES: No acute or aggressive osseous abnormalities identified. Multilevel thoracic and lumbar spondylosis. Sclerotic focus right sacrum likely benign bone island. Cortical sclerosis posterior iliac bones likely chronic radiation osteopathy. OTHER: Penile pump device. _______________         IMPRESSION  1. Small nonobstructing left upper pole renal calculi. Multiple bilateral renal cysts all of which are simple except for one right upper pole cyst which contains a thin septation. 2. Right hydroureteronephrosis very likely due to encasement of distal right ureter within the area of plaque-like soft tissue thickening along right lower pelvic sidewall. 3. Bladder decompressed with suprapubic catheter.   4. Chronic concentric mural thickening of rectum very likely post radiation effect. 5. Dense plaque-like soft tissue and dystrophic calcifications inferior lateral pelvic sidewall and presacral space compatible with post treatment effect without change. 6. Cholelithiasis. 7. 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 Signed By: Jade Ibarra MD on 4/4/2020 9:50 AM                Dictated by: Lilian Zuñiga on Sat Apr 4, 2020  9:35:56 AM EDT      Signed Sha Delgado MD   4/4/2020  9:50 AM              [facility] [[id number]]       ~~This report was copied and pasted from the servicing EHR system. ~~         US Results:  No results found for this or any previous visit.                               Principal Problem:    Bilateral hydronephrosis (6/19/2020)    Active Problems:    Hydronephrosis (6/22/2020)

## 2020-06-23 NOTE — ANESTHESIA POSTPROCEDURE EVALUATION
Procedure(s):  CYSTOSCOPY /RANDOM BLADDER BIOPSIES  BILATERAL URETEROSCOPY//BILATERAL STENT EXCHANGE/C-ARM  SUPRAPUBIC TUBE EXCHANGE. general    Anesthesia Post Evaluation      Multimodal analgesia: multimodal analgesia used between 6 hours prior to anesthesia start to PACU discharge  Patient location during evaluation: PACU  Patient participation: complete - patient participated  Level of consciousness: awake  Pain management: adequate  Airway patency: patent  Anesthetic complications: no  Cardiovascular status: acceptable  Respiratory status: acceptable  Hydration status: acceptable  Post anesthesia nausea and vomiting:  none  Final Post Anesthesia Temperature Assessment:  Normothermia (36.0-37.5 degrees C)      INITIAL Post-op Vital signs:   Vitals Value Taken Time   /69 6/22/2020  7:58 PM   Temp 36.2 °C (97.2 °F) 6/22/2020  7:46 PM   Pulse 98 6/22/2020  8:03 PM   Resp 27 6/22/2020  8:03 PM   SpO2 98 % 6/22/2020  8:03 PM   Vitals shown include unvalidated device data.

## 2020-06-23 NOTE — ROUTINE PROCESS
Bedside and Verbal shift change report given to Felton Cano (oncoming nurse) by Minnie Prado RN (offgoing nurse). Report included the following information SBAR, Kardex, Intake/Output, MAR and Recent Results.

## 2020-06-23 NOTE — PROGRESS NOTES
Discharge order noted for today. Orders reviewed. Met with patient at bedside; his wife will be transporting him home. No further needs identified at this time.  remains available if needed.   Jaison Man RN - Outcomes Manager  [phone]

## 2020-06-23 NOTE — ROUTINE PROCESS
Bedside shift change report given to Mali Shook RN (oncoming nurse) by Queenie Morales RN (offgoing nurse). Report included the following information SBAR, Kardex, Procedure Summary, Intake/Output, MAR, Recent Results and Med Rec Status.

## 2020-06-23 NOTE — PROGRESS NOTES
Problem: Falls - Risk of  Goal: *Absence of Falls  Description: Document Mariposa Israel Fall Risk and appropriate interventions in the flowsheet.   Outcome: Progressing Towards Goal  Note: Fall Risk Interventions:  Mobility Interventions: Communicate number of staff needed for ambulation/transfer, Patient to call before getting OOB, Utilize walker, cane, or other assistive device         Medication Interventions: Assess postural VS orthostatic hypotension, Patient to call before getting OOB, Teach patient to arise slowly    Elimination Interventions: Call light in reach, Patient to call for help with toileting needs              Problem: Patient Education: Go to Patient Education Activity  Goal: Patient/Family Education  Outcome: Progressing Towards Goal     Problem: Pain  Goal: *Control of Pain  Outcome: Progressing Towards Goal     Problem: Patient Education: Go to Patient Education Activity  Goal: Patient/Family Education  Outcome: Progressing Towards Goal

## 2020-06-23 NOTE — PROGRESS NOTES
SUBJECTIVE:    Patient is sitting up in chair without any issues. Denies any headaches or dizziness. No chest pain or abdominal pain. No nausea or vomiting. No SOB or cough. Patient prefers going home later today after the procedure if possible. OBJECTIVE:    /64 (BP 1 Location: Left arm, BP Patient Position: At rest)   Pulse 70   Temp 96.8 °F (36 °C)   Resp 16   Wt 89.1 kg (196 lb 8 oz)   SpO2 99%   BMI 27.41 kg/m²     General appearance - alert, well appearing, and in no distress  Chest -clear air entry noted in bases, no wheezes  Heart - S1 and S2 normal  Abdomen - soft, nontender, nondistended, Bowel sounds present, suprapubic catheter in situ  Neurological - alert, oriented, normal speech, no focal findings noted  Extremities - no pedal edema noted    ASSESSMENT:    1.  Bilateral hydroureteronephrosis s/p  Cysto, retrogrades, BNC dilation, bilateral diagnostic ureteroscopy, bladder biopsies, bilateral stent exchange, and SP tube exchange 6/22/2020  2. Urinary retention due to urinary stricture status post suprapubic catheter  3. History of prostate cancer  4. History of colon cancer status post resections and pelvic radiation  5. Gastric ulcer with recent GI bleed  6. Dark stool, improving. Stable H&H currently. 7.  Coronary artery disease  8. Hypertension  9. Dyslipidemia  10. Constipation, improved  11. UTI status post treatment       PLAN:    Continue current management  Discussed with urologist nurse practitioner: Patient can be discharged home from their side and they will follow this patient as an outpatient  Discussed with ID: No need for antibiotic  Discharge patient home today    Disclaimer: Sections of this note are dictated using utilizing voice recognition software, which may have resulted in some phonetic based errors in grammar and contents.  Even though attempts were made to correct all the mistakes, some may have been missed, and remained in the body of the document. If questions arise, please contact our department. Recent Results (from the past 24 hour(s))   SARS-COV-2    Collection Time: 06/22/20  2:40 PM   Result Value Ref Range    Specimen source Nasopharyngeal      COVID-19 rapid test Not detected NOTD     METABOLIC PANEL, BASIC    Collection Time: 06/23/20  2:50 AM   Result Value Ref Range    Sodium 136 136 - 145 mmol/L    Potassium 4.6 3.5 - 5.5 mmol/L    Chloride 105 100 - 111 mmol/L    CO2 26 21 - 32 mmol/L    Anion gap 5 3.0 - 18 mmol/L    Glucose 130 (H) 74 - 99 mg/dL    BUN 10 7.0 - 18 MG/DL    Creatinine 0.69 0.6 - 1.3 MG/DL    BUN/Creatinine ratio 14 12 - 20      GFR est AA >60 >60 ml/min/1.73m2    GFR est non-AA >60 >60 ml/min/1.73m2    Calcium 8.8 8.5 - 10.1 MG/DL   CBC WITH AUTOMATED DIFF    Collection Time: 06/23/20  2:50 AM   Result Value Ref Range    WBC 9.9 4.6 - 13.2 K/uL    RBC 3.59 (L) 4.70 - 5.50 M/uL    HGB 10.2 (L) 13.0 - 16.0 g/dL    HCT 30.8 (L) 36.0 - 48.0 %    MCV 85.8 74.0 - 97.0 FL    MCH 28.4 24.0 - 34.0 PG    MCHC 33.1 31.0 - 37.0 g/dL    RDW 13.4 11.6 - 14.5 %    PLATELET 394 636 - 735 K/uL    MPV 9.6 9.2 - 11.8 FL    NEUTROPHILS 83 (H) 40 - 73 %    LYMPHOCYTES 9 (L) 21 - 52 %    MONOCYTES 8 3 - 10 %    EOSINOPHILS 0 0 - 5 %    BASOPHILS 0 0 - 2 %    ABS. NEUTROPHILS 8.2 (H) 1.8 - 8.0 K/UL    ABS. LYMPHOCYTES 0.9 0.9 - 3.6 K/UL    ABS. MONOCYTES 0.8 0.05 - 1.2 K/UL    ABS. EOSINOPHILS 0.0 0.0 - 0.4 K/UL    ABS.  BASOPHILS 0.0 0.0 - 0.1 K/UL    DF AUTOMATED

## 2020-06-23 NOTE — ROUTINE PROCESS
I have reviewed discharge instructions with the patient. The patient verbalized understanding. Discharge medications reviewed with patient and appropriate educational materials and side effects teaching were provided. Current Discharge Medication List  
  
START taking these medications Details  
senna-docusate (PERICOLACE) 8.6-50 mg per tablet Take 1 Tab by mouth daily. Indications: hold it if diarrhea Qty: 10 Tab, Refills: 0 CONTINUE these medications which have NOT CHANGED Details  
amLODIPine (NORVASC) 2.5 mg tablet take 1 tablet by mouth once daily  
  
losartan (COZAAR) 50 mg tablet take 1 tablet by mouth twice a day  
  
omeprazole (PRILOSEC) 20 mg capsule take 1 capsule by mouth twice a day for 30 DAYS THEN TAKE 1 once daily THEREAFTER  
  
sodium chloride irrigation (Sterile Saline) 0.9 % irrigation Use PRN for irrigation flushes. Qty: 2000 mL, Refills: 12 Urinary Bag (iMeigu DISPOZ-A-BAG-FLIP-ARGENIS VLV) misc 10 Bags by Does Not Apply route as needed for Other. Qty: 3 Each, Refills: 3  
  
metoprolol (LOPRESSOR) 100 mg tablet 50 mg at bedtime as directed for dose pack. cyanocobalamin (VITAMIN B-12) 500 mcg tablet Take 500 mcg by mouth daily. atorvastatin (LIPITOR) 40 mg tablet Take 40 mg by mouth daily. ergocalciferol (VITAMIN D2) 50,000 unit capsule Take 50,000 Units by mouth every seven (7) days. aspirin 81 mg tablet Take 81 mg by mouth at bedtime as directed for dose pack. Patient armband removed and shredded.

## 2020-06-23 NOTE — DISCHARGE INSTRUCTIONS
JJ Stent Instructions    Your doctor may place a JJ stent in place in your kidney. The stent is a small hollow plastic tube placed from the kidney to the bladder and is much like a long, spaghetti sized straw with multiple holes throughout its length. If you had surgery for kidney stones, this stent will prevent the kidney from being blocked by stone fragments or swelling of the ureter (drainage tube from kidney). If you had the stent placed to relieve blockage of the kidney, the stent will also allow the kidney to drain more quickly. In either case, the stent is not permanent and must be removed or exchanged within the next several days to months. If the stent is not removed as instructed, complex surgery may be required to remove the stent and the kidney may be permanently damaged. You may have a string secured to your skin. The string is attached to the stent and allows easy removal. Do not pull the string unless instructed to by your doctor. Please notify the doctor if the stent is accidentally removed. Some stents do not have strings. Your doctor must remove these stents by performing a cystoscopic examination, which involves placement of a telescope into the bladder to remove the stent. The procedure takes about five minutes and is usually scheduled in the office. The stent may cause some mild pain in your kidney area after urinating. You may also notice frequency or urgency of urination or mildly bloody urine. This is all normal. Over the counter, you can obtain a medication, Azo, that will help with these symptoms. If you have fever over 101 degrees, severe back or abdominal pain, inability to urinate or if you are passing large clots in the urine, please call us or go to a hospital emergency room. We will call you to arrange follow up and removal or exchange of the JJ stent.  If you do not hear from our office within 2 business days after your surgery, please call us or use [employer] (accessible through our website if you are not registered) to request an appointment. [facility]  [phone]  [email or web address]

## 2020-06-29 NOTE — CDMP QUERY
Patient admitted with , noted to have . If possible, please document in progress notes and d/c summary if you are evaluating and/or treating any of the following: ? Acute cystis ? Chronic cystis ? Acute on chronic cystis ? Other, please specify ? Unable to Determine The medical record reflects the following: 
  Risk Factors: 66 yom  admitted for bilateral 
hydroureteronephrosis and underwent Cysto, retrogrades, BNC dilation, bilateral 
diagnostic ureteroscopy, bladder biopsies, bilateral stent exchange and  
 
  Clinical Indicators:urine culture this admission revealed 40,000 gram negative rods, urine on admission foul smelling, and 
 SP tube urine culture prior to admission revealed stenotrophomonas pseudomonas,Infectious Disease MD documented clinical presentation \"consistent with partially so treated 
stenotrophomonas, Pseudomonas cystitis. \" Treatment:  Urine culture ,Patient was treated with IV Baltazar Stover ordered and given Thank you Mendy Owens RN   [phone]

## 2021-02-25 ENCOUNTER — HOSPITAL ENCOUNTER (OUTPATIENT)
Dept: PREADMISSION TESTING | Age: 76
Discharge: HOME | End: 2021-02-25
Payer: MEDICARE

## 2021-02-25 ENCOUNTER — TRANSCRIBE ORDER (OUTPATIENT)
Dept: REGISTRATION | Age: 76
End: 2021-02-25

## 2021-02-25 DIAGNOSIS — N13.30 BILATERAL HYDRONEPHROSIS: ICD-10-CM

## 2021-02-25 DIAGNOSIS — Z01.818 PREOP TESTING: ICD-10-CM

## 2021-02-25 DIAGNOSIS — Z01.818 PREOP TESTING: Primary | ICD-10-CM

## 2021-02-25 LAB
ANION GAP SERPL CALC-SCNC: 6 MMOL/L (ref 3–18)
ATRIAL RATE: 71 BPM
BUN SERPL-MCNC: 18 MG/DL (ref 7–18)
BUN/CREAT SERPL: 21 (ref 12–20)
CALCIUM SERPL-MCNC: 9.3 MG/DL (ref 8.5–10.1)
CALCULATED P AXIS, ECG09: 19 DEGREES
CALCULATED R AXIS, ECG10: -42 DEGREES
CALCULATED T AXIS, ECG11: 75 DEGREES
CHLORIDE SERPL-SCNC: 110 MMOL/L (ref 100–111)
CO2 SERPL-SCNC: 26 MMOL/L (ref 21–32)
CREAT SERPL-MCNC: 0.84 MG/DL (ref 0.6–1.3)
DIAGNOSIS, 93000: NORMAL
ERYTHROCYTE [DISTWIDTH] IN BLOOD BY AUTOMATED COUNT: 14.4 % (ref 11.6–14.5)
GLUCOSE SERPL-MCNC: 82 MG/DL (ref 74–99)
HCT VFR BLD AUTO: 33.9 % (ref 36–48)
HGB BLD-MCNC: 11.2 G/DL (ref 13–16)
MCH RBC QN AUTO: 28.6 PG (ref 24–34)
MCHC RBC AUTO-ENTMCNC: 33 G/DL (ref 31–37)
MCV RBC AUTO: 86.5 FL (ref 74–97)
P-R INTERVAL, ECG05: 172 MS
PLATELET # BLD AUTO: 441 K/UL (ref 135–420)
PMV BLD AUTO: 10.1 FL (ref 9.2–11.8)
POTASSIUM SERPL-SCNC: 4.5 MMOL/L (ref 3.5–5.5)
Q-T INTERVAL, ECG07: 366 MS
QRS DURATION, ECG06: 92 MS
QTC CALCULATION (BEZET), ECG08: 427 MS
RBC # BLD AUTO: 3.92 M/UL (ref 4.7–5.5)
SODIUM SERPL-SCNC: 142 MMOL/L (ref 136–145)
VENTRICULAR RATE, ECG03: 82 BPM
WBC # BLD AUTO: 6.7 K/UL (ref 4.6–13.2)

## 2021-02-25 PROCEDURE — 36415 COLL VENOUS BLD VENIPUNCTURE: CPT

## 2021-02-25 PROCEDURE — U0003 INFECTIOUS AGENT DETECTION BY NUCLEIC ACID (DNA OR RNA); SEVERE ACUTE RESPIRATORY SYNDROME CORONAVIRUS 2 (SARS-COV-2) (CORONAVIRUS DISEASE [COVID-19]), AMPLIFIED PROBE TECHNIQUE, MAKING USE OF HIGH THROUGHPUT TECHNOLOGIES AS DESCRIBED BY CMS-2020-01-R: HCPCS

## 2021-02-25 PROCEDURE — 80048 BASIC METABOLIC PNL TOTAL CA: CPT

## 2021-02-25 PROCEDURE — 93005 ELECTROCARDIOGRAM TRACING: CPT

## 2021-02-25 PROCEDURE — 85027 COMPLETE CBC AUTOMATED: CPT

## 2021-02-26 LAB — SARS-COV-2, COV2NT: NOT DETECTED

## 2021-03-01 ENCOUNTER — ANESTHESIA EVENT (OUTPATIENT)
Dept: SURGERY | Age: 76
End: 2021-03-01
Payer: MEDICARE

## 2021-03-02 ENCOUNTER — HOSPITAL ENCOUNTER (OUTPATIENT)
Age: 76
Discharge: HOME | End: 2021-03-02
Attending: UROLOGY | Admitting: UROLOGY
Payer: MEDICARE

## 2021-03-02 ENCOUNTER — ANESTHESIA (OUTPATIENT)
Dept: SURGERY | Age: 76
End: 2021-03-02
Payer: MEDICARE

## 2021-03-02 ENCOUNTER — APPOINTMENT (OUTPATIENT)
Dept: GENERAL RADIOLOGY | Age: 76
End: 2021-03-02
Attending: UROLOGY
Payer: MEDICARE

## 2021-03-02 VITALS
BODY MASS INDEX: 27.16 KG/M2 | HEART RATE: 64 BPM | OXYGEN SATURATION: 98 % | RESPIRATION RATE: 20 BRPM | DIASTOLIC BLOOD PRESSURE: 75 MMHG | TEMPERATURE: 97.7 F | WEIGHT: 194 LBS | SYSTOLIC BLOOD PRESSURE: 166 MMHG | HEIGHT: 71 IN

## 2021-03-02 PROCEDURE — C2617 STENT, NON-COR, TEM W/O DEL: HCPCS | Performed by: UROLOGY

## 2021-03-02 PROCEDURE — 2709999900 HC NON-CHARGEABLE SUPPLY: Performed by: UROLOGY

## 2021-03-02 PROCEDURE — 76010000138 HC OR TIME 0.5 TO 1 HR: Performed by: UROLOGY

## 2021-03-02 PROCEDURE — 77030010545: Performed by: UROLOGY

## 2021-03-02 PROCEDURE — 74420 UROGRAPHY RTRGR +-KUB: CPT

## 2021-03-02 PROCEDURE — 74011250636 HC RX REV CODE- 250/636: Performed by: NURSE ANESTHETIST, CERTIFIED REGISTERED

## 2021-03-02 PROCEDURE — 77030019927 HC TBNG IRR CYSTO BAXT -A: Performed by: UROLOGY

## 2021-03-02 PROCEDURE — 74011000636 HC RX REV CODE- 636: Performed by: UROLOGY

## 2021-03-02 PROCEDURE — 00910 ANES TRANSURETHRAL PX NOS: CPT | Performed by: ANESTHESIOLOGY

## 2021-03-02 PROCEDURE — 99100 ANES PT EXTEME AGE<1 YR&>70: CPT | Performed by: ANESTHESIOLOGY

## 2021-03-02 PROCEDURE — 74011000250 HC RX REV CODE- 250: Performed by: NURSE ANESTHETIST, CERTIFIED REGISTERED

## 2021-03-02 PROCEDURE — 76210000021 HC REC RM PH II 0.5 TO 1 HR: Performed by: UROLOGY

## 2021-03-02 PROCEDURE — 77030040361 HC SLV COMPR DVT MDII -B: Performed by: UROLOGY

## 2021-03-02 PROCEDURE — 77030013079 HC BLNKT BAIR HGGR 3M -A: Performed by: ANESTHESIOLOGY

## 2021-03-02 PROCEDURE — 74011000258 HC RX REV CODE- 258: Performed by: UROLOGY

## 2021-03-02 PROCEDURE — 74011250636 HC RX REV CODE- 250/636: Performed by: UROLOGY

## 2021-03-02 PROCEDURE — 77030020268 HC MISC GENERAL SUPPLY: Performed by: UROLOGY

## 2021-03-02 PROCEDURE — 99100 ANES PT EXTEME AGE<1 YR&>70: CPT | Performed by: NURSE ANESTHETIST, CERTIFIED REGISTERED

## 2021-03-02 PROCEDURE — 76060000032 HC ANESTHESIA 0.5 TO 1 HR: Performed by: UROLOGY

## 2021-03-02 PROCEDURE — 00910 ANES TRANSURETHRAL PX NOS: CPT | Performed by: NURSE ANESTHETIST, CERTIFIED REGISTERED

## 2021-03-02 PROCEDURE — 77030005206: Performed by: UROLOGY

## 2021-03-02 PROCEDURE — 77030034696 HC CATH URETH FOL 2W BARD -A: Performed by: UROLOGY

## 2021-03-02 PROCEDURE — 76210000006 HC OR PH I REC 0.5 TO 1 HR: Performed by: UROLOGY

## 2021-03-02 DEVICE — URETERAL STENT SET
Type: IMPLANTABLE DEVICE | Site: URETER | Status: FUNCTIONAL
Brand: PERCUFLEX™ PLUS

## 2021-03-02 RX ORDER — MIDAZOLAM HYDROCHLORIDE 1 MG/ML
INJECTION, SOLUTION INTRAMUSCULAR; INTRAVENOUS AS NEEDED
Status: DISCONTINUED | OUTPATIENT
Start: 2021-03-02 | End: 2021-03-02 | Stop reason: HOSPADM

## 2021-03-02 RX ORDER — INSULIN LISPRO 100 [IU]/ML
INJECTION, SOLUTION INTRAVENOUS; SUBCUTANEOUS ONCE
Status: DISCONTINUED | OUTPATIENT
Start: 2021-03-02 | End: 2021-03-02 | Stop reason: HOSPADM

## 2021-03-02 RX ORDER — LIDOCAINE HYDROCHLORIDE 20 MG/ML
INJECTION, SOLUTION EPIDURAL; INFILTRATION; INTRACAUDAL; PERINEURAL AS NEEDED
Status: DISCONTINUED | OUTPATIENT
Start: 2021-03-02 | End: 2021-03-02 | Stop reason: HOSPADM

## 2021-03-02 RX ORDER — SODIUM CHLORIDE 0.9 % (FLUSH) 0.9 %
5-40 SYRINGE (ML) INJECTION EVERY 8 HOURS
Status: DISCONTINUED | OUTPATIENT
Start: 2021-03-02 | End: 2021-03-02 | Stop reason: HOSPADM

## 2021-03-02 RX ORDER — FENTANYL CITRATE 50 UG/ML
INJECTION, SOLUTION INTRAMUSCULAR; INTRAVENOUS AS NEEDED
Status: DISCONTINUED | OUTPATIENT
Start: 2021-03-02 | End: 2021-03-02 | Stop reason: HOSPADM

## 2021-03-02 RX ORDER — SODIUM CHLORIDE, SODIUM LACTATE, POTASSIUM CHLORIDE, CALCIUM CHLORIDE 600; 310; 30; 20 MG/100ML; MG/100ML; MG/100ML; MG/100ML
25 INJECTION, SOLUTION INTRAVENOUS CONTINUOUS
Status: CANCELLED | OUTPATIENT
Start: 2021-03-02

## 2021-03-02 RX ORDER — FENTANYL CITRATE 50 UG/ML
25 INJECTION, SOLUTION INTRAMUSCULAR; INTRAVENOUS AS NEEDED
Status: CANCELLED | OUTPATIENT
Start: 2021-03-02

## 2021-03-02 RX ORDER — SODIUM CHLORIDE 0.9 % (FLUSH) 0.9 %
5-40 SYRINGE (ML) INJECTION AS NEEDED
Status: DISCONTINUED | OUTPATIENT
Start: 2021-03-02 | End: 2021-03-02 | Stop reason: HOSPADM

## 2021-03-02 RX ORDER — ONDANSETRON 2 MG/ML
4 INJECTION INTRAMUSCULAR; INTRAVENOUS ONCE
Status: CANCELLED | OUTPATIENT
Start: 2021-03-02 | End: 2021-03-02

## 2021-03-02 RX ORDER — LIDOCAINE HYDROCHLORIDE 10 MG/ML
0.1 INJECTION, SOLUTION EPIDURAL; INFILTRATION; INTRACAUDAL; PERINEURAL AS NEEDED
Status: DISCONTINUED | OUTPATIENT
Start: 2021-03-02 | End: 2021-03-02 | Stop reason: HOSPADM

## 2021-03-02 RX ORDER — SODIUM CHLORIDE, SODIUM LACTATE, POTASSIUM CHLORIDE, CALCIUM CHLORIDE 600; 310; 30; 20 MG/100ML; MG/100ML; MG/100ML; MG/100ML
75 INJECTION, SOLUTION INTRAVENOUS CONTINUOUS
Status: DISCONTINUED | OUTPATIENT
Start: 2021-03-02 | End: 2021-03-02 | Stop reason: HOSPADM

## 2021-03-02 RX ORDER — PROPOFOL 10 MG/ML
INJECTION, EMULSION INTRAVENOUS AS NEEDED
Status: DISCONTINUED | OUTPATIENT
Start: 2021-03-02 | End: 2021-03-02 | Stop reason: HOSPADM

## 2021-03-02 RX ORDER — PROPOFOL 10 MG/ML
VIAL (ML) INTRAVENOUS
Status: DISCONTINUED | OUTPATIENT
Start: 2021-03-02 | End: 2021-03-02 | Stop reason: HOSPADM

## 2021-03-02 RX ADMIN — FENTANYL CITRATE 25 MCG: 50 INJECTION, SOLUTION INTRAMUSCULAR; INTRAVENOUS at 09:09

## 2021-03-02 RX ADMIN — SODIUM CHLORIDE, SODIUM LACTATE, POTASSIUM CHLORIDE, AND CALCIUM CHLORIDE 75 ML/HR: 600; 310; 30; 20 INJECTION, SOLUTION INTRAVENOUS at 08:15

## 2021-03-02 RX ADMIN — FENTANYL CITRATE 25 MCG: 50 INJECTION, SOLUTION INTRAMUSCULAR; INTRAVENOUS at 09:12

## 2021-03-02 RX ADMIN — FAMOTIDINE 20 MG: 10 INJECTION INTRAVENOUS at 08:29

## 2021-03-02 RX ADMIN — FENTANYL CITRATE 25 MCG: 50 INJECTION, SOLUTION INTRAMUSCULAR; INTRAVENOUS at 09:02

## 2021-03-02 RX ADMIN — MIDAZOLAM HYDROCHLORIDE 1 MG: 2 INJECTION, SOLUTION INTRAMUSCULAR; INTRAVENOUS at 08:48

## 2021-03-02 RX ADMIN — FENTANYL CITRATE 25 MCG: 50 INJECTION, SOLUTION INTRAMUSCULAR; INTRAVENOUS at 09:24

## 2021-03-02 RX ADMIN — PROPOFOL 50 MCG/KG/MIN: 10 INJECTION, EMULSION INTRAVENOUS at 09:02

## 2021-03-02 RX ADMIN — PROPOFOL 30 MG: 10 INJECTION, EMULSION INTRAVENOUS at 09:02

## 2021-03-02 RX ADMIN — MIDAZOLAM HYDROCHLORIDE 1 MG: 2 INJECTION, SOLUTION INTRAMUSCULAR; INTRAVENOUS at 09:02

## 2021-03-02 RX ADMIN — LIDOCAINE HYDROCHLORIDE 50 MG: 20 INJECTION, SOLUTION EPIDURAL; INFILTRATION; INTRACAUDAL; PERINEURAL at 09:02

## 2021-03-02 RX ADMIN — PROPOFOL 30 MG: 10 INJECTION, EMULSION INTRAVENOUS at 09:11

## 2021-03-02 NOTE — OP NOTES
Operative Note  Patient: Karla Tilley               Sex: male             MRN: [id number]  YOB: 1945      Age:  66 y.o. Preoperative Diagnosis: N13.30 BILATERAL HYPDRONEPHROSIS  Postoperative Diagnosis:  N13.30 BILATERAL HYPDRONEPHROSIS  Surgeon: Belem Pires     Indication: This is a 67 y/o man bilateral distal ureteral strictures managed with bilateral stents, hx of [place] 9 CaP s/p cryo with undetectable PSA, history of pelvic radiation for colon cancer, history of AUS, erosion, explant and BNC s/p bilateral ureteroscopy with negative biopsies as well as bilateral random bladder biopsies (negative) and negative MRI living with chronic SP tube here today for cystoscopy via SP tube tract and JJ stent exchange. Preop culture treated with Abx. Procedure:    1) Cystoscopy via SP tube tract   2) Bilateral Retrograde pyelogram with interpretation  3) Bilateral Ureteral stent exchange   4) Suprapubic tube exchange     Findings:    1). Bladder was normal small, radiated, no tumors   2). Retrograde pyelogram revealed mild bilateral hydro with distal strictures. Stents NOT encrusted at 5 month interval.   3). Bilateral 7x26 JJ stents exchanged     Narrative of Events: The patient was brought to the operative suite. Anesthesia was induced and preoperative antibiotics were administered. They were then placed in the supine position and their suprapubic region was prepped and draped in the usual fashion. A surgical timeout was performed confirming the patient's name, date of birth, laterality, and antibiotics. All were in agreement. A 22 Portuguese cystourethroscope was then inserted into SP tube tract and cystoscopy performed. This revealed bladder neck occlusion, radiated contracted bladder without mass, bilateral stents without encrustation (5montsh). Starting with the left, the stent was grasped and wire placed. Tigertail was advanced and RPG performed revealing mild hydro.   Wire replaced and new 7x26 JJ stent placed. The same was then performed on the right side. Mild hydro noted there was well. 7x26 JJ stent placed there was well. Repeat cysto confirmed good location of stents. New 22fr morgan was placed into SP tube tract and irrigated clear and patient was then awoken and transferred to the recovery room in stable condition. Estimated Blood Loss: <5CC     Anesthesia:  General                  Implants:   Implant Name Type Inv. Item Serial No.  Lot No. LRB No. Used Action   SET STENT URTRL 7FR ANÍBAL 26CML PRCFLX PLS DBLE PGTL STRGHT  - [id number]  SET STENT URTRL 7FR ANÍBAL 26CML PRCFLX PLS DBLE PGTL STRGHT TI  [id number] [id number] Right 1 Implanted   SET STENT URTRL 7FR ANÍBAL 26CML PRCFLX PLS DBLE PGTL STRGHT  - [id number]  SET STENT URTRL 7FR ANÍBAL 26CML PRCFLX PLS DBLE PGTL STRGHT TI  [employer] [id number] Right 1 Implanted       Specimens: * No specimens in log *     Drains: 7x26 JJ stents bilaterally, 22 FrSP tube            Complications:  None           Plan:  1. Next stent change in 6 months   2.  See lei 2 weeks preop for SP tube change and culture     Khanh Marie MD  3/2/2021

## 2021-03-02 NOTE — DISCHARGE INSTRUCTIONS
Discharge Instructions          ADDITIONAL INFORMATION:   You have indwelling ureteral stents which frequently causes slight discomfort in the flank region and bladder spasms. If you are bothered by these symptoms, please contact our office and we can presribe you flomax as needed for flank discomfort or Ditropan for bladder spasms. The indwelling stent will need to be removed/exchanged as directed below. If the stent is left in place without appropriate follow-up, it may become encrusted with stone and/or infected. If that occurs, you will require multiple additional surgeries to fix this. It is very important you follow up for appropriate removal or exchange of ureteral stents. If you experience any fevers > 100.4, significant nausea/vomiting, or significant worsening of pain, please contact us at the number below. It is common to experience mild, recurrent blood in your urine and this is likely due to stent irritation. If the bleeding continues to worsen with passage of clots, you are unable to urinate, or you experience significant light-headedness, please contact us at the number below. FOLLOW UP CARE:  Follow up with Dr Debra Linares for stent exchange as planned. DISCHARGE SUMMARY from Nurse  PATIENT INSTRUCTIONS:  After general anesthesia or intravenous sedation, for 24 hours or while taking prescription Narcotics:  · Limit your activities  · Do not drive and operate hazardous machinery  · Do not make important personal or business decisions  · Do  not drink alcoholic beverages  · If you have not urinated within 8 hours after discharge, please contact your surgeon on call.     Report the following to your surgeon:  · Excessive pain, swelling, redness or odor of or around the surgical area  · Temperature over 100.5  · Nausea and vomiting lasting longer than 4 hours or if unable to take medications  · Any signs of decreased circulation or nerve impairment to extremity: change in color, persistent  numbness, tingling, coldness or increase pain  · Any questions    What to do at Home:  Recommended activity: Activity as tolerated and no driving for today. *  Please give a list of your current medications to your Primary Care Provider. *  Please update this list whenever your medications are discontinued, doses are      changed, or new medications (including over-the-counter products) are added. *  Please carry medication information at all times in case of emergency situations. These are general instructions for a healthy lifestyle:    No smoking/ No tobacco products/ Avoid exposure to second hand smoke  Surgeon General's Warning:  Quitting smoking now greatly reduces serious risk to your health. Obesity, smoking, and sedentary lifestyle greatly increases your risk for illness    A healthy diet, regular physical exercise & weight monitoring are important for maintaining a healthy lifestyle    You may be retaining fluid if you have a history of heart failure or if you experience any of the following symptoms:  Weight gain of 3 pounds or more overnight or 5 pounds in a week, increased swelling in our hands or feet or shortness of breath while lying flat in bed. Please call your doctor as soon as you notice any of these symptoms; do not wait until your next office visit. The discharge information has been reviewed with the patient. The patient verbalized understanding. Discharge medications reviewed with the patient and appropriate educational materials and side effects teaching were provided. ___________________________________________________________________________________________________________________________________    Patient Education        Cystoscopy: What to Expect at Home  Your Recovery     A cystoscopy is a procedure that lets a doctor look inside of the bladder and the urethra. The urethra is the tube that carries urine from the bladder to outside the body.  The doctor uses a thin, lighted tool called a cystoscope. Your bladder is filled with fluid. This stretches the bladder so that your doctor can look closely at the inside of your bladder. After the cystoscopy, your urethra may be sore at first, and it may burn when you urinate for the first few days after the procedure. You may feel the need to urinate more often, and your urine may be pink. These symptoms should get better in 1 or 2 days. You will probably be able to go back to most of your usual activities in 1 or 2 days. This care sheet gives you a general idea about how long it will take for you to recover. But each person recovers at a different pace. Follow the steps below to get better as quickly as possible. How can you care for yourself at home? Activity    · Rest when you feel tired. Getting enough sleep will help you recover.     · Try to walk each day. Start by walking a little more than you did the day before. Bit by bit, increase the amount you walk. Walking boosts blood flow and helps prevent pneumonia and constipation.     · Avoid strenuous activities, such as bicycle riding, jogging, weight lifting, or aerobic exercise, until your doctor says it is okay.     · Ask your doctor when you can drive again.     · Most people are able to return to work within 1 or 2 days after the procedure.     · You may shower and take baths as usual.     · Ask your doctor when it is okay for you to have sex. Diet    · You can eat your normal diet. If your stomach is upset, try bland, low-fat foods like plain rice, broiled chicken, toast, and yogurt.     · Drink plenty of fluids (unless your doctor tells you not to). Medicines    · Take pain medicines exactly as directed. ? If the doctor gave you a prescription medicine for pain, take it as prescribed.   ? If you are not taking a prescription pain medicine, ask your doctor if you can take an over-the-counter medicine.     · If you think your pain medicine is making you sick to your stomach:  ? Take your medicine after meals (unless your doctor has told you not to). ? Ask your doctor for a different pain medicine.     · If your doctor prescribed antibiotics, take them as directed. Do not stop taking them just because you feel better. You need to take the full course of antibiotics. Follow-up care is a key part of your treatment and safety. Be sure to make and go to all appointments, and call your doctor if you are having problems. It's also a good idea to know your test results and keep a list of the medicines you take. When should you call for help? Call 911 anytime you think you may need emergency care. For example, call if:    · You passed out (lost consciousness).     · You have severe trouble breathing.     · You have sudden chest pain and shortness of breath, or you cough up blood.     · You have severe belly pain. Call your doctor now or seek immediate medical care if:    · You are sick to your stomach or cannot keep fluids down.     · Your urine is still red or you see blood clots after you have urinated several times.     · You have trouble passing urine or stool, especially if you have pain or swelling in your lower belly.     · You have signs of a blood clot, such as:  ? Pain in your calf, back of the knee, thigh, or groin. ? Redness and swelling in your leg or groin.     · You develop a fever or severe chills.     · You have pain in your back just below your rib cage. This is called flank pain. Watch closely for changes in your health, and be sure to contact your doctor if:    · You have pain or burning when you urinate. A burning feeling is normal for a day or two after the test, but call if it does not get better.     · You have a frequent urge to urinate but can pass only small amounts of urine.     · Your urine is pink, red, or cloudy, or smells bad.  It is normal for the urine to have a pinkish color for a few days after the test, but call if it does not get better. Where can you learn more? Go to http://www.gray.com/  Enter C842 in the search box to learn more about \"Cystoscopy: What to Expect at Home. \"  Current as of: June 29, 2020               Content Version: 12.6  © 9353-1853 [employer]. Care instructions adapted under license by [employer] (which disclaims liability or warranty for this information). If you have questions about a medical condition or this instruction, always ask your healthcare professional. [address] any warranty or liability for your use of this information.

## 2021-03-02 NOTE — H&P
H&P    Patient: Jaspal Zuniga               Sex: male          DOA: 3/2/2021       YOB: 1945      Age:  66 y.o.                     ASSESSMENT:   1. Bilateral distal ureteral strictures currently managed with ureteral stents               S/p bilateral diagnostic ureteroscopy and stent exchange 6/22/2020              Bladder and ureteral biopsies negative for malignancy              MRI negative for pelvic malignancy        2. Dense BNC, inaccessible by dilation, with long term SP tube x many years     3. 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 Colon cancer s/p Resection and pelvic radiation   5. Chronic Constipation     Doing well  No interval change  On Abx for + culture       Heidi Galdamez MD  [phone] Office  [phone]  Pager    CC: Ureteral strictures    HISTORY OF PRESENT ILLNESS:  Jaspal Zuniga is a 66 y.o. male with distal ureteral strictures managed with stents, here today for stent exchange. preop culture + on Abx. Asymptomatic. AUA Symptom Score 9/23/2020   Over the past month how often have you had the sensation that your bladder was not completely empty after you finished urinating?  (No Data)       Past Medical History:   Diagnosis Date    CAD (coronary artery disease)     Colon cancer ([address] 1978    Alyse    Elevated cholesterol     Elevated PSA 3/21/13    Dr. Tara Hunter Erectile dysfunction     Exertional dyspnea     Groin pain, chronic, right     Hypertension     Incontinence     Infection of prosthesis ([address]     Kidney stone     MI (myocardial infarction) ([address] 2012    Nonspecific abnormal electrocardiogram (ECG) (EKG)     Prostate cancer (HCC)     cT1c aidee 4 + 5 adenocarcinoma of the prostate s/p cryoablation 9/9/13    Stress incontinence     Traumatic membranous urethral stricture     Ulcer 2010    Alyse Alcaraz    Unspecified constipation     Urethral stricture     Urinary incontinence     Urinary retention     Urinary retention 2/22/2017    Urinary tract infection with hematuria        Past Surgical History:   Procedure Laterality Date    HX CORONARY ARTERY BYPASS GRAFT  2010    CABG. .. triple    in [address]    HX CRYOABLATION OF THE PROSTATE  9/9/13    HX HERNIA REPAIR      right    HX PROSTATE SURGERY      cryoablation of Prostate-Suprapubic Tube    HX TOTAL COLECTOMY  1978    HX UROLOGICAL  [id number]    cystoscopy, laser cystolitholapaxy    HX UROLOGICAL  6/2015    pump in bladder    HX UROLOGICAL  6/17/16    Explant urinary sphincter    HX UROLOGICAL  12/28/2016    reddy awad, Cystourethroscopy, Urethral dilation, Suprapubic catheter placement       Social History     Tobacco Use    Smoking status: Never Smoker    Smokeless tobacco: Never Used   Substance Use Topics    Alcohol use: Yes     Alcohol/week: 0.0 - 1.0 standard drinks     Comment: seldom    Drug use: No       Allergies   Allergen Reactions    Penicillins Hives     Has tolerated cefepime.       Bactrim [Sulfamethoxazole-Trimethoprim] Hives       Family History   Problem Relation Age of Onset    Asthma Sister     Cancer Other         uncle, prostate       Current Facility-Administered Medications   Medication Dose Route Frequency Provider Last Rate Last Admin    lidocaine (PF) (XYLOCAINE) 10 mg/mL (1 %) injection 0.1 mL  0.1 mL SubCUTAneous PRN Heriberto JUSTIN Dykes        lactated Ringers infusion  75 mL/hr IntraVENous CONTINUOUS Heriberto JUSTIN Dykes        sodium chloride (NS) flush 5-40 mL  5-40 mL IntraVENous Q8H Heriberto JUSTIN Dykes        sodium chloride (NS) flush 5-40 mL  5-40 mL IntraVENous PRN Heribertonadir Dykes CRNA        famotidine (PF) (PEPCID) 20 mg in 0.9% sodium chloride 10 mL injection  20 mg IntraVENous Ramo Sanchez CRNA        insulin lispro (HUMALOG) injection   SubCUTAneous Martha Caballero CRNA        gentamicin (GARAMYCIN) 380 mg in 0.9% sodium chloride 100 mL IVPB 380 mg IntraVENous ONCE Nadya Quick MD        vancomycin (VANCOCIN) 1,000 mg in 0.9% sodium chloride 250 mL (VIAL-MATE)  1,000 mg IntraVENous Q1H Nadya Quick MD           Review of Systems  Constitutional: No fever, chills, or weight loss  Respiratory: No dyspnea  Cardiovascular: No chest pain  Gastrointestinal: No vomiting or abdominal pain. Genitourinary: Denies frequency, urgency, dysuria, hematuria. Neurological: No focal motor changes. PHYSICAL EXAMINATION:   Visit Vitals  Ht 5' 11\" (1.803 m)   Wt 195 lb (88.5 kg)   BMI 27.20 kg/m²     Constitutional: Well developed, well nourished male. No acute distress. HEENT: Normocephalic, Atraumatic, EOM's intact   CV:  Normal radial pulse. Respiratory: No respiratory distress or difficulties breathing   Abdomen:  Nontender, nondistended.  Male:  No CVA tenderness. 22fr SP tube in place draining CYU. Skin: No evidence of jaundice. Normal color  Neuro/Psych:  Alert and oriented. Affect appropriate. Lymphatic:   No enlarged inguinal lymph nodes. REVIEW OF LABS AND IMAGING:      Labs: Results:   Chemistry  No results for input(s): GLU, NA, K, CL, CO2, BUN, CREA, CA, AGAP, BUCR, TBIL, AP, TP, ALB, GLOB, AGRAT in the last 72 hours. No lab exists for component: GPT   CBC w/Diff No results for input(s): WBC, RBC, HGB, HCT, PLT, GRANS, LYMPH, EOS, HGBEXT, HCTEXT, PLTEXT in the last 72 hours. Cultures No results for input(s): CULT in the last 72 hours. All Micro Results     None            Urinalysis Potassium   Date Value Ref Range Status   02/25/2021 4.5 3.5 - 5.5 mmol/L Final     Creatinine   Date Value Ref Range Status   02/25/2021 0.84 0.6 - 1.3 MG/DL Final     BUN   Date Value Ref Range Status   02/25/2021 18 7.0 - 18 MG/DL Final     Prostate Specific Ag   Date Value Ref Range Status   02/07/2020 <0.03  0 - 4 ng/mL Final     Comment:     (Methodology: Roche ECLIA)            PSA No results for input(s): PSA in the last 72 hours. Coagulation Lab Results   Component Value Date/Time    Prothrombin time 10.8 2015 11:18 AM    INR 1.09 2015 11:18 AM           US Results (most recent):  No results found for this or any previous visit. chest    CT Results (most recent):   Results from Abstract encounter on 20   [address] [facility],   [address] [facility]  [address]  [phone]     Imaging Result     Name:    Shade Martinez ([id number])    Sex: Male     :  1945     Ordering Provider: Javy REYES Provider:       Diagnosis:    Hydronephrosis [N13.30 (ICD-10-CM)]    None Specified       Procedures Performed:    CT UROGRAM    [id number]     Exam Date/Time:    2020  2:07 PM           EXAM: CT UROGRAM         CLINICAL INDICATION/HISTORY:  N13.30: Hydronephrosis      > Additional: Right lower quadrant pain for 2 weeks. Remote history of renal calculus disease. Prostate carcinoma. COMPARISON: CT urography 2015         TECHNIQUE: Helical CT imaging of the abdomen and pelvis was performed with and without intravenous contrast. Multiphase imaging of the kidneys, renal collecting systems, and bladder was performed. Multiplanar reformats were generated. One or more dose reduction techniques were used on this CT: automated exposure control, adjustment of the mAs and/or kVp according to patient size, and iterative reconstruction techniques. The specific techniques used on this CT exam have been documented in the patient's electronic medical record. Digital Imaging and Communications in Medicine (DICOM) format image data are available to nonaffiliated external healthcare facilities or entities on a secure, media free, reciprocally searchable basis with patient authorization for at least a 12-month period after this study. _______________         FINDINGS:    LOWER CHEST: Calcified pleural plaques.  Subpleural groundglass opacities dependent lower lobes compatible with fibrosis. CABG. KIDNEYS, URETER, BLADDER:       > Urolithiasis: Contiguous 2 mm calculi upper pole left kidney. > Parenchyma: No solid mass. Normal symmetric enhancement. Multiple bilateral renal cysts primarily simple. One upper pole right renal cyst contains a thin septation with calcification. This cyst measures about 5.2 cm in size. Largest left renal cyst interpolar region measures 2.5 cm.      > Upper urinary tracts: There is right hydronephrosis and ureterectasis to the level of distal right ureter. No evidence of filling defect within the opacified portions of renal collecting systems and ureters. > Renal vasculature: Renal arteries and veins are patent.      > Bladder: Bladder decompressed with suprapubic catheter and not well evaluated. LIVER, BILIARY: Liver is normal. No biliary dilation. Cholelithiasis. PANCREAS: Normal.    SPLEEN: Normal.    ADRENALS: Normal.    LYMPH NODES: No enlarged lymph nodes. GASTROINTESTINAL TRACT: No bowel dilatation. Concentric mural thickening of rectum probably post therapy findings without change. VASCULATURE: Severe aortoiliac atherosclerosis. Atherosclerotic plaque at origin origin right renal artery, celiac and superior mesenteric arteries. PELVIC ORGANS: Dense dystrophic appearing calcifications extraperitoneal lower pelvis along pelvic side walls and presacral space with associated noncalcified soft tissue plaque-like thickening presacral space extending to posterior serosal margin of rectum. BONES: No acute or aggressive osseous abnormalities identified. Multilevel thoracic and lumbar spondylosis. Sclerotic focus right sacrum likely benign bone island. Cortical sclerosis posterior iliac bones likely chronic radiation osteopathy. OTHER: Penile pump device. _______________         IMPRESSION  1. Small nonobstructing left upper pole renal calculi.  Multiple bilateral renal cysts all of which are simple except for one right upper pole cyst which contains a thin septation. 2. Right hydroureteronephrosis very likely due to encasement of distal right ureter within the area of plaque-like soft tissue thickening along right lower pelvic sidewall. 3. Bladder decompressed with suprapubic catheter. 4. Chronic concentric mural thickening of rectum very likely post radiation effect. 5. Dense plaque-like soft tissue and dystrophic calcifications inferior lateral pelvic sidewall and presacral space compatible with post treatment effect without change. 6. Cholelithiasis. 7. 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 Signed By: Bailey Moreno MD on 4/4/2020 9:50 AM                Dictated by: Jennifer Rainey on Sat Apr 4, 2020  9:35:56 AM EDT      Signed Renita Noe MD   4/4/2020  9:50 AM              [facility] [[id number]]       ~~This report was copied and pasted from the servicing EHR system. ~~            ABD      MRI Results (most recent): No procedure found. No diagnosis found.

## 2021-03-02 NOTE — ANESTHESIA PREPROCEDURE EVALUATION
Relevant Problems   No relevant active problems       Anesthetic History   No history of anesthetic complications            Review of Systems / Medical History  Patient summary reviewed and pertinent labs reviewed    Pulmonary  Within defined limits                 Neuro/Psych   Within defined limits           Cardiovascular    Hypertension        Dysrhythmias   Past MI, CAD and CABG    Exercise tolerance: >4 METS     GI/Hepatic/Renal         Renal disease: stones  PUD     Endo/Other        Arthritis     Other Findings              Physical Exam    Airway  Mallampati: III  TM Distance: 4 - 6 cm  Neck ROM: decreased range of motion   Mouth opening: Diminished (comment)     Cardiovascular    Rhythm: regular  Rate: normal         Dental    Dentition: Poor dentition     Pulmonary  Breath sounds clear to auscultation               Abdominal  GI exam deferred       Other Findings            Anesthetic Plan    ASA: 3  Anesthesia type: MAC and general - backup          Induction: Intravenous  Anesthetic plan and risks discussed with: Patient

## 2021-03-03 NOTE — ANESTHESIA POSTPROCEDURE EVALUATION
Procedure(s):  CYSTOSCOPY/BILATERAL DOUBLE J STENT EXCHANGE/C-ARM. MAC, general - backup    Anesthesia Post Evaluation      Multimodal analgesia: multimodal analgesia used between 6 hours prior to anesthesia start to PACU discharge  Patient location during evaluation: PACU  Patient participation: complete - patient participated  Level of consciousness: awake and alert  Pain management: adequate  Airway patency: patent  Anesthetic complications: no  Cardiovascular status: acceptable  Respiratory status: acceptable  Hydration status: acceptable  Post anesthesia nausea and vomiting:  none  Final Post Anesthesia Temperature Assessment:  Normothermia (36.0-37.5 degrees C)      INITIAL Post-op Vital signs:   Vitals Value Taken Time   /75 03/02/21 1016   Temp 36.5 °C (97.7 °F) 03/02/21 0936   Pulse 70 03/02/21 1016   Resp 13 03/02/21 1016   SpO2 100 % 03/02/21 1016   Vitals shown include unvalidated device data.

## 2021-08-03 PROBLEM — I25.10 CAD (CORONARY ARTERY DISEASE): Status: RESOLVED | Noted: 2021-08-03 | Resolved: 2021-08-03

## 2021-10-06 ENCOUNTER — HOSPITAL ENCOUNTER (OUTPATIENT)
Dept: LAB | Age: 76
Discharge: HOME | End: 2021-10-06
Payer: MEDICARE

## 2021-10-06 PROCEDURE — 88305 TISSUE EXAM BY PATHOLOGIST: CPT

## 2021-10-13 ENCOUNTER — HOSPITAL ENCOUNTER (OUTPATIENT)
Dept: LAB | Age: 76
Discharge: HOME | End: 2021-10-13
Payer: MEDICARE

## 2021-10-13 LAB
ANION GAP SERPL CALC-SCNC: 4 MMOL/L (ref 3–18)
BUN SERPL-MCNC: 14 MG/DL (ref 7–18)
BUN/CREAT SERPL: 18 (ref 12–20)
CALCIUM SERPL-MCNC: 8.9 MG/DL (ref 8.5–10.1)
CHLORIDE SERPL-SCNC: 107 MMOL/L (ref 100–111)
CO2 SERPL-SCNC: 28 MMOL/L (ref 21–32)
CREAT SERPL-MCNC: 0.79 MG/DL (ref 0.6–1.3)
GLUCOSE SERPL-MCNC: 100 MG/DL (ref 74–99)
POTASSIUM SERPL-SCNC: 4 MMOL/L (ref 3.5–5.5)
SODIUM SERPL-SCNC: 139 MMOL/L (ref 136–145)

## 2021-10-13 PROCEDURE — 36415 COLL VENOUS BLD VENIPUNCTURE: CPT

## 2021-10-13 PROCEDURE — 80048 BASIC METABOLIC PNL TOTAL CA: CPT

## 2021-10-19 ENCOUNTER — HOSPITAL ENCOUNTER (OUTPATIENT)
Dept: LAB | Age: 76
Discharge: HOME | End: 2021-10-19
Payer: MEDICARE

## 2021-10-19 PROCEDURE — 88331 PATH CONSLTJ SURG 1 BLK 1SPC: CPT

## 2021-10-19 PROCEDURE — 88305 TISSUE EXAM BY PATHOLOGIST: CPT

## 2024-11-11 NOTE — PHYSICIAN ADVISORY
Letter of Status Determination:   
Recommend Changing patient status from INPATIENT to OBSERVATION Pt Name:  Augie Hsu  
MR#  [id number] [facility]#   [id number] Room and Hospital  504/01  @ [facility] Hospitalization date  6/19/2020  1:52 PM  
Current Attending Physician  Cate More MD  
Principal diagnosis  Bilateral hydronephrosis Clinicals  66 y.o. y.o  male hospitalized  with bilateral hydroureteronephrosis, urinary retention, urinary stricture, prostate CA, colon CA s/p resection and pelvic radiation, CAD, MI, HTN, HLD, gastric ulcer and chronic constipation who is a direct admit to the hospital for bilateral hydroureternephrosis of unclear etiology who is scheduled for cystoscopy, urethral dilation, possible TURBT and bilateral diagnostic ureteroscopy on Monday 6/22/20. [facility] [place] [facility] Does  NOT apply STATUS DETERMINATION  On the basis of review of available clinical data, documentation in the chart  It is our recommendation that the patient's status is changed from INPATIENT to OBSERVATION The final decision of the patient's hospitalization status depends on the attending physician's judgment Additional comments Insurance  [place]or: [place] MED[place] / Plan: [place] MED[place]ARE PART [place] & B / Product Type: Medicare /   
 
  
 
 
Francy Chandra antonio Physician Advisor [employer] Insurance Information [facility] PART A & B Phone: [phone] Subscriber: Cristobal Bocanegra. Subscriber#: [id number] Group#:  Precert#:   
   
 BLUE CROSS/[place] BL[place]OSS FEDERAL RETIRED Phone:   
 Subscriber: Cristobal Bocanegra. Subscriber#: [id number] Group#: [id number] Precert#:   
  
 
 
 
 
 
6/20/2020 4:09 PM [id number] INPATIENT 504/01 [facility] Augie Hsu Payor: [place] MEDICARE / Plan: [place] MEDICARE PART A & B / Product Type: Medicare /   
Fareed To Detail Level: Simple Plan: Intralesional to 5 sites posterior scalp

## (undated) DEVICE — SYRINGE,TOOMEY,IRRIGATION,70CC,STERILE: Brand: MEDLINE

## (undated) DEVICE — SOLUTION IRRIG 3000ML 0.9% SOD CHL FLX CONT 0797208] ICU MEDICAL INC]

## (undated) DEVICE — STERILE POLYISOPRENE POWDER-FREE SURGICAL GLOVES: Brand: PROTEXIS

## (undated) DEVICE — 4-PORT MANIFOLD: Brand: NEPTUNE 2

## (undated) DEVICE — CATH URETH FOL 2W SH 22FRX5ML -- CONVERT TO ITEM 363075

## (undated) DEVICE — Y-TYPE TUR/BLADDER IRRIGATION SET, REGULATING CLAMP

## (undated) DEVICE — TRAY PREP DRY W/ PREM GLV 2 APPL 6 SPNG 2 UNDPD 1 OVERWRAP

## (undated) DEVICE — STER SINGLE BASIN SET W/BOWLS: Brand: CARDINAL HEALTH

## (undated) DEVICE — URETEROSCOPIC BIOPSY FORCEPS: Brand: PIRANHA

## (undated) DEVICE — SOLUTION IV 1000ML 0.9% SOD CHL

## (undated) DEVICE — BLANKET WRM AD PREM MISTRAL-AIR

## (undated) DEVICE — SOLUTION IRRIG 3000ML H2O STRL BAG

## (undated) DEVICE — LUB SURG MEDC STRL 2OZ TUBE MC -- MEDICHOICE

## (undated) DEVICE — BAG DRAINAGE CUST DISP

## (undated) DEVICE — SHEET, T, LAPAROTOMY, STERILE: Brand: MEDLINE

## (undated) DEVICE — GOWN,PREVENTION PLUS,XLN/XL,ST,24/CS: Brand: MEDLINE

## (undated) DEVICE — GARMENT,MEDLINE,DVT,INT,CALF,FOAM,MED: Brand: MEDLINE

## (undated) DEVICE — GDWIRE 3CM FLX-TIP 0.038X150CM -- BX/5 SENSOR

## (undated) DEVICE — FLEXOR, URETERAL ACCESS SHEATH WITH AQ, HYDROPHILIC COATING: Brand: FLEXOR

## (undated) DEVICE — TIGER TAIL

## (undated) DEVICE — PENCIL ES L3M BTTN SWCH S STL HEX LOK BLDE ELECTRD HOLSTER

## (undated) DEVICE — CATH URETH FOL 2W SH 22FRX5ML --

## (undated) DEVICE — GOWN,REINFORCED,POLY,AURORA,XLARGE,STRL: Brand: MEDLINE

## (undated) DEVICE — GUIDEWIRE: Brand: AMPLATZ SUPER STIFF™

## (undated) DEVICE — Z DUP USE 2565107 PACK SURG PROC LEG CYSTO T-DRAPE REINF TBL CVR HND TWL

## (undated) DEVICE — TELFA NON-ADHERENT ABSORBENT DRESSING: Brand: TELFA

## (undated) DEVICE — TUBING IRRIG L77IN DIA0.241IN L BOR FOR CYSTO W/ NVENT

## (undated) DEVICE — GARMENT,MEDLINE,DVT,SEQUENTIAL,CALF,MD: Brand: MEDLINE

## (undated) DEVICE — DRAPE TWL SURG 16X26IN BLU ORB04] ALLCARE INC]

## (undated) DEVICE — GAUZE,SPONGE,4"X4",16PLY,STRL,LF,10/TRAY: Brand: MEDLINE

## (undated) DEVICE — GDWIRE UROL STR 150CM FLX TP -- BX/5 SENSOR

## (undated) DEVICE — NITINOL STONE RETRIEVAL BASKET: Brand: ZERO TIP

## (undated) DEVICE — MEDI-VAC NON-CONDUCTIVE SUCTION TUBING: Brand: CARDINAL HEALTH

## (undated) DEVICE — BAG DRAIN URIN 2000ML LF STRL -- CONVERT TO ITEM 363123

## (undated) DEVICE — URETHRAL DILATOR SET: Brand: COOK

## (undated) DEVICE — DEVICE STBL AD TRICOT ANCHR PD FOR 3 W F CATH STATLOK

## (undated) DEVICE — X-RAY SPONGES,12 PLY: Brand: DERMACEA

## (undated) DEVICE — FLEX ADVANTAGE 3000CC: Brand: FLEX ADVANTAGE

## (undated) DEVICE — KIT CLN UP BON SECOURS MARYV

## (undated) DEVICE — HEX-LOCKING BLADE ELECTRODE: Brand: EDGE

## (undated) DEVICE — SINGLE-USE DIGITAL FLEXIBLE URETEROSCOPE: Brand: LITHOVUE

## (undated) DEVICE — SYR IRR CATH TIP LR ADPT 70ML -- CONVERT TO ITEM 363120

## (undated) DEVICE — SYR 10ML LUER LOK 1/5ML GRAD --